# Patient Record
Sex: FEMALE | Race: WHITE | NOT HISPANIC OR LATINO | Employment: FULL TIME | URBAN - METROPOLITAN AREA
[De-identification: names, ages, dates, MRNs, and addresses within clinical notes are randomized per-mention and may not be internally consistent; named-entity substitution may affect disease eponyms.]

---

## 2023-05-13 DIAGNOSIS — E78.5 HYPERLIPIDEMIA, UNSPECIFIED: ICD-10-CM

## 2023-05-13 RX ORDER — EZETIMIBE 10 MG/1
TABLET ORAL
Qty: 90 TABLET | Refills: 3 | Status: SHIPPED | OUTPATIENT
Start: 2023-05-13 | End: 2024-05-15

## 2023-06-10 LAB
ALANINE AMINOTRANSFERASE (SGPT) (U/L) IN SER/PLAS: 33 U/L (ref 7–45)
ASPARTATE AMINOTRANSFERASE (SGOT) (U/L) IN SER/PLAS: 26 U/L (ref 9–39)
CHOLESTEROL (MG/DL) IN SER/PLAS: 192 MG/DL (ref 0–199)
CHOLESTEROL IN HDL (MG/DL) IN SER/PLAS: 45.6 MG/DL
CHOLESTEROL IN LDL (MG/DL) IN SER/PLAS BY DIRECT ASSAY: 138 MG/DL (ref 0–129)
CHOLESTEROL/HDL RATIO: 4.2
LDL: 119 MG/DL (ref 0–99)
THYROTROPIN (MIU/L) IN SER/PLAS BY DETECTION LIMIT <= 0.05 MIU/L: 2.31 MIU/L (ref 0.44–3.98)
TRIGLYCERIDE (MG/DL) IN SER/PLAS: 139 MG/DL (ref 0–149)
VLDL: 28 MG/DL (ref 0–40)

## 2023-06-16 ENCOUNTER — OFFICE VISIT (OUTPATIENT)
Dept: PRIMARY CARE | Facility: CLINIC | Age: 63
End: 2023-06-16
Payer: COMMERCIAL

## 2023-06-16 DIAGNOSIS — E78.00 PURE HYPERCHOLESTEROLEMIA: ICD-10-CM

## 2023-06-16 DIAGNOSIS — K43.9 VENTRAL HERNIA WITHOUT OBSTRUCTION OR GANGRENE: Primary | ICD-10-CM

## 2023-06-16 DIAGNOSIS — E03.9 ACQUIRED HYPOTHYROIDISM: ICD-10-CM

## 2023-06-16 DIAGNOSIS — E55.9 VITAMIN D DEFICIENCY: ICD-10-CM

## 2023-06-16 PROBLEM — E66.09 CLASS 2 OBESITY DUE TO EXCESS CALORIES WITHOUT SERIOUS COMORBIDITY WITH BODY MASS INDEX (BMI) OF 35.0 TO 35.9 IN ADULT: Status: ACTIVE | Noted: 2023-06-16

## 2023-06-16 PROBLEM — E66.812 CLASS 2 OBESITY DUE TO EXCESS CALORIES WITHOUT SERIOUS COMORBIDITY WITH BODY MASS INDEX (BMI) OF 35.0 TO 35.9 IN ADULT: Status: ACTIVE | Noted: 2023-06-16

## 2023-06-16 PROCEDURE — 1036F TOBACCO NON-USER: CPT | Performed by: INTERNAL MEDICINE

## 2023-06-16 PROCEDURE — 99213 OFFICE O/P EST LOW 20 MIN: CPT | Performed by: INTERNAL MEDICINE

## 2023-06-16 PROCEDURE — 3008F BODY MASS INDEX DOCD: CPT | Performed by: INTERNAL MEDICINE

## 2023-06-16 RX ORDER — CHOLECALCIFEROL (VITAMIN D3) 50 MCG
2000 TABLET ORAL EVERY 24 HOURS
COMMUNITY
End: 2023-07-26 | Stop reason: ALTCHOICE

## 2023-06-16 RX ORDER — LEVOTHYROXINE SODIUM 75 UG/1
1 TABLET ORAL DAILY
COMMUNITY
Start: 2017-04-03 | End: 2023-11-11

## 2023-06-16 RX ORDER — ROSUVASTATIN CALCIUM 20 MG/1
TABLET, COATED ORAL
COMMUNITY
Start: 2017-03-20 | End: 2023-08-08

## 2023-06-16 RX ORDER — ASPIRIN 81 MG/1
TABLET ORAL EVERY 24 HOURS
COMMUNITY
Start: 2019-05-29

## 2023-06-16 ASSESSMENT — ENCOUNTER SYMPTOMS
CONSTITUTIONAL NEGATIVE: 1
CONSTIPATION: 0
CARDIOVASCULAR NEGATIVE: 1
RESPIRATORY NEGATIVE: 1
GASTROINTESTINAL NEGATIVE: 1
FATIGUE: 0
DIAPHORESIS: 0
NERVOUS/ANXIOUS: 0
NEUROLOGICAL NEGATIVE: 1
DRY SKIN: 0
HOARSE VOICE: 0
EYES NEGATIVE: 1
DEPRESSED MOOD: 0
MUSCULOSKELETAL NEGATIVE: 1

## 2023-06-16 NOTE — PROGRESS NOTES
"Subjective   Patient ID: Heather Patton is a 62 y.o. female who presents for Follow-up (4 mo fu).    Hyperlipidemia  This is a chronic problem. The problem is controlled. Recent lipid tests were reviewed and are normal. Exacerbating diseases include hypothyroidism and obesity. She has no history of chronic renal disease. Risk factors for coronary artery disease include dyslipidemia, obesity and post-menopausal.   Thyroid Problem  Presents for follow-up visit. Patient reports no anxiety, cold intolerance, constipation, depressed mood, diaphoresis, dry skin, fatigue or hoarse voice. The symptoms have been stable. Her past medical history is significant for hyperlipidemia.        Review of Systems   Constitutional: Negative.  Negative for diaphoresis and fatigue.   HENT: Negative.  Negative for hoarse voice.    Eyes: Negative.    Respiratory: Negative.     Cardiovascular: Negative.    Gastrointestinal: Negative.  Negative for constipation.   Endocrine: Negative for cold intolerance.   Musculoskeletal: Negative.    Skin: Negative.    Neurological: Negative.    Psychiatric/Behavioral:  The patient is not nervous/anxious.        Objective   /73 (BP Location: Right arm, Patient Position: Sitting, BP Cuff Size: Adult)   Pulse 69   Temp 36.6 °C (97.8 °F) (Temporal)   Ht 1.683 m (5' 6.25\")   Wt 101 kg (223 lb)   BMI 35.72 kg/m²     Physical Exam  Vitals reviewed.   Constitutional:       Appearance: She is obese.   HENT:      Head: Normocephalic and atraumatic.   Eyes:      Conjunctiva/sclera: Conjunctivae normal.   Cardiovascular:      Rate and Rhythm: Normal rate and regular rhythm.      Pulses: Normal pulses.   Pulmonary:      Effort: Pulmonary effort is normal.      Breath sounds: Normal breath sounds.   Abdominal:      Palpations: Abdomen is soft.      Hernia: A hernia is present.   Musculoskeletal:         General: Normal range of motion.      Cervical back: Normal range of motion.   Skin:     General: Skin is " warm and dry.   Neurological:      General: No focal deficit present.   Psychiatric:         Mood and Affect: Mood normal.         Assessment/Plan   Problem List Items Addressed This Visit       Hyperlipidemia    Relevant Orders    Lipid Panel    Hypothyroidism    Relevant Orders    CBC and Auto Differential    Lipid Panel    Comprehensive Metabolic Panel    Thyroid Stimulating Hormone    Ventral hernia - Primary    Relevant Orders    CBC and Auto Differential    Vitamin D deficiency    Relevant Orders    Vitamin D 25-Hydroxy,Total   We have a serious discussion about her weight now, her BMI is excessive, it has been a long time that BMI has not been changing much, we are expecting some weight loss with the lifestyle modification this summer I told her.  Otherwise no PVCs identified, TSH is stable, she remains euthyroid clinically and chemically, she remains on 2000 international units of vitamin D therapy, LDL has been fluctuating and dual combination therapy has been maintained.  Her screening test are reviewed, her ventral hernia is going to be operated somewhere in August or September, surgical evaluation was reviewed, there is no active urological issue.  Visceral obesity persist, there is no obvious lymphedema as she is thinking she may have since he has a procedure where the right groin entry was done for ablative therapy several years ago.  Maintain same therapy, screening tests are up to date, follow-up in 4 months with labs.

## 2023-07-26 VITALS
HEIGHT: 68 IN | BODY MASS INDEX: 33.8 KG/M2 | DIASTOLIC BLOOD PRESSURE: 73 MMHG | SYSTOLIC BLOOD PRESSURE: 117 MMHG | WEIGHT: 223 LBS | HEART RATE: 69 BPM | TEMPERATURE: 97.8 F

## 2023-07-26 RX ORDER — CHOLECALCIFEROL (VITAMIN D3) 50 MCG
2 TABLET ORAL DAILY
COMMUNITY

## 2023-08-08 DIAGNOSIS — E78.5 HYPERLIPIDEMIA, UNSPECIFIED: ICD-10-CM

## 2023-08-08 RX ORDER — ROSUVASTATIN CALCIUM 20 MG/1
20 TABLET, COATED ORAL DAILY
Qty: 90 TABLET | Refills: 3 | Status: SHIPPED | OUTPATIENT
Start: 2023-08-08

## 2023-09-15 ENCOUNTER — HOSPITAL ENCOUNTER (OUTPATIENT)
Dept: DATA CONVERSION | Facility: HOSPITAL | Age: 63
End: 2023-09-16
Attending: SURGERY | Admitting: SURGERY
Payer: COMMERCIAL

## 2023-09-15 DIAGNOSIS — K66.0 PERITONEAL ADHESIONS (POSTPROCEDURAL) (POSTINFECTION): ICD-10-CM

## 2023-09-15 DIAGNOSIS — K43.2 INCISIONAL HERNIA WITHOUT OBSTRUCTION OR GANGRENE: ICD-10-CM

## 2023-09-15 DIAGNOSIS — K43.9 VENTRAL HERNIA WITHOUT OBSTRUCTION OR GANGRENE: ICD-10-CM

## 2023-09-15 LAB
APPEARANCE, URINE: ABNORMAL
BACTERIA, URINE: ABNORMAL /HPF
BILIRUBIN, URINE: NEGATIVE
BLOOD, URINE: ABNORMAL
COLOR, URINE: YELLOW
GLUCOSE, URINE: NEGATIVE MG/DL
KETONES, URINE: NEGATIVE MG/DL
LEUKOCYTE ESTERASE, URINE: NEGATIVE
MUCUS, URINE: ABNORMAL /LPF
NITRITE, URINE: NEGATIVE
PH, URINE: 5 (ref 5–8)
PROTEIN, URINE: NEGATIVE MG/DL
RBC, URINE: 2 /HPF (ref 0–5)
SPECIFIC GRAVITY, URINE: 1.02 (ref 1–1.03)
SQUAMOUS EPITHELIAL CELLS, URINE: 3 /HPF
UROBILINOGEN, URINE: <2 MG/DL (ref 0–1.9)
WBC, URINE: 2 /HPF (ref 0–5)

## 2023-09-16 LAB
ERYTHROCYTE DISTRIBUTION WIDTH (RATIO) BY AUTOMATED COUNT: 12.9 % (ref 11.5–14.5)
ERYTHROCYTE MEAN CORPUSCULAR HEMOGLOBIN CONCENTRATION (G/DL) BY AUTOMATED: 33.1 G/DL (ref 32–36)
ERYTHROCYTE MEAN CORPUSCULAR VOLUME (FL) BY AUTOMATED COUNT: 92 FL (ref 80–100)
ERYTHROCYTES (10*6/UL) IN BLOOD BY AUTOMATED COUNT: 4.21 X10E12/L (ref 4–5.2)
HEMATOCRIT (%) IN BLOOD BY AUTOMATED COUNT: 38.7 % (ref 36–46)
HEMOGLOBIN (G/DL) IN BLOOD: 12.8 G/DL (ref 12–16)
LEUKOCYTES (10*3/UL) IN BLOOD BY AUTOMATED COUNT: 9.9 X10E9/L (ref 4.4–11.3)
PLATELETS (10*3/UL) IN BLOOD AUTOMATED COUNT: 270 X10E9/L (ref 150–450)

## 2023-09-29 VITALS
RESPIRATION RATE: 18 BRPM | TEMPERATURE: 97.7 F | BODY MASS INDEX: 33.31 KG/M2 | HEART RATE: 88 BPM | HEIGHT: 68 IN | WEIGHT: 219.8 LBS | DIASTOLIC BLOOD PRESSURE: 92 MMHG | SYSTOLIC BLOOD PRESSURE: 157 MMHG

## 2023-09-30 NOTE — DISCHARGE SUMMARY
Send Summary:     Note Recipients:        Discharge:    Summary:   Admission Date: .15-Sep-2023 10:17:00   Discharge Date: 16-Sep-2023   Admission Reason: Ventral hernia   Final Discharge Diagnoses: Ventral hernia   Procedures: Laparoscopic recurrent ventral hernia  repair   Hospital Course:    Planned admission postoperatively for pain control, pulmonary toilet, observation after surgery as outlined above.  Discharge planning once pain was adequately controlled  with oral medications, ambulating, tolerating a diet, voiding easily, no concerns on postoperative presentation.  Office follow-up arranged      Discharge Information:    and Continuing Care:   Lab Results - Pending:    None  Radiology Results - Pending: None   Discharge Instructions:    Additional Orders:           Additional Instructions:   Diet as tolerated  No lifting greater than 20 pounds x 8 weeks  Okay to shower, avoid saturating dressing  Okay to drive once off pain medicine    Follow Up Appointments:    Follow-Up Appointment 01:           Physician/Dept/Service:   Dex          Reason for Referral:   Postop visit          Call to Schedule in:   2 weeks          Phone Number:   [phone]    Discharge Medications: Home Medication   Zetia 10 mg oral tablet - 1 tab(s) orally once a day  rosuvastatin 20 mg oral tablet - 1 tab(s) orally once a day  levothyroxine 75 mcg (0.075 mg) oral capsule - 1 cap(s) orally once a day  Vitamin D3 50 mcg (2000 intl units) oral capsule - 2 cap(s) orally once a day  aspirin 81 mg oral tablet - 1 tab(s) orally once a day     PRN Medication   oxycodone-acetaminophen 5 mg-325 mg oral tablet - 1 tab(s) orally every 6 hours x 5 days, As Needed -.Patient Location  -for pain G89.1  docusate sodium 100 mg oral capsule - 1 cap(s) orally 1 to 2 times a day, As Needed -for constipation     DNR Status:   ·  Code Status Code Status order at time of discharge: Full Code       Electronic Signatures:  Katherine Kowalski)   (Signed 15-Sep-2023 13:25)   Authored: Send Summary, Summary Content, Ongoing Care,  DNR Status, Note Completion      Last Updated: 15-Sep-2023 13:25 by Katherine Kowalski)

## 2023-09-30 NOTE — H&P
History of Present Illness:   History Present Illness:  Reason for surgery: recurrent ventral hernia   HPI:    As above    Allergies:        Allergies:  ·  NKDA :   ·  Baking  Soda: Swelling/Edema, Rash (Severe), Itching  ·  Tape  - Adhesive, Bandaids, Paper: Rash, Itching    Home Medication Review:   Home Medications Reviewed: yes     Impression/Procedure:   ·  Impression and Planned Procedure: laparoscopic VHR possible open       ERAS (Enhanced Recovery After Surgery):  ·  ERAS Patient: no       Vital Signs:  Temperature C: 36.5 degrees C   Temperature F: 97.7 degrees F   Heart Rate: 88 beats per minute   Respiratory Rate: 18 breath per minute   Blood Pressure Systolic: 157 mm/Hg   Blood Pressure Diastolic: 92 mm/Hg     Physical Exam by System:    Constitutional: Well developed, awake/alert/oriented  x3, no distress, alert and cooperative   Eyes: PERRL, EOMI, clear sclera   ENMT: mucous membranes moist, no apparent injury,  no lesions seen   Head/Neck: Neck supple, no apparent injury   Respiratory/Thorax: Patent airways, CTAB, normal  breath sounds with good chest expansion, thorax symmetric   Cardiovascular: Regular, rate and rhythm   Gastrointestinal: Nondistended, soft, non-tender,  no rebound tenderness or guarding, no masses palpable, no organomegaly   Musculoskeletal: ROM intact, no joint swelling, normal  strength   Extremities: normal extremities, no cyanosis edema,  contusions or wounds, no clubbing   Neurological: alert and oriented x3, intact senses,  motor, response and reflexes, normal strength   Psychological: Appropriate mood and behavior   Skin: Warm and dry, no lesions, no rashes     Consent:   COVID-19 Consent:  ·  COVID-19 Risk Consent Surgeon has reviewed key risks related to the risk of jose COVID-19 and if they contract COVID-19 what the risks are.       Electronic Signatures:  Katherine Kowalski)  (Signed 15-Sep-2023 12:47)   Authored: History of Present Illness, Allergies, Home   Medication Review, Impression/Procedure, ERAS, Physical Exam, Consent, Note Completion      Last Updated: 15-Sep-2023 12:47 by Katherine Kowalski)

## 2023-10-01 NOTE — OP NOTE
PROCEDURE DETAILS    Preoperative Diagnosis:  Recurrent ventral hernia  Postoperative Diagnosis:  Recurrent ventral hernia  Surgeon: Katherine Kowalski  Resident/Fellow/Other Assistant: Beto Ordaz    Procedure:  1. LAPAROSCOPIC VENTRAL HERNIA REPAIR, RECURRENT    Anesthesia: No anesthesiologist associated with this case  Estimated Blood Loss: 0  Findings: Report  Specimens(s) Collected: no,     Complications: none        Operative Report:   After satisfactory induction of anesthesia, patient was prepped and draped.  Perioperative tap block performed by anesthesia.  Local anesthesia was infiltrated and limited supraumbilical  incision was made, dissection carried down to fascia which was then sharply incised allowing placement of a Doll trocar.  Pneumoperitoneum was established and exploratory laparoscopy was carried out.  Under direct visualization 2 additional 5 mm trocars  were placed in the mid abdomen bilaterally.  Initial survey revealed filmy omental adhesions involving the known lower abdominal recurrent ventral hernia.  These were easily taken down with the LigaSure device.  With complete adhesiolysis, the hernia  sac and ablation of the peritoneum within the hernia was accomplished with a combination of the LigaSure device and cautery.  Under reduced intra-abdominal pressure, the hernia defect was measured at approximately 9 cm in length by 4 cm in width.  Christopher  Navarro device was used after separate stab incisions overlying the hernia defect in the midline for transfascial reapproximation of the fascial defect using 0 Ethibond suture.  Pneumoperitoneum was reestablished demonstrating excellent reapproximation  of the fascia in the midline, in a tension-free manner.  An 11 x 15 cm dual sided Ventralex mesh with echo positioning device was chosen for underlay repair.  This was rolled and passed into the abdominal cavity, centered appropriately along the hernia  defect and secured in double crown  technique using the absorbable tacking device.  Echo device removed intact.  Mesh placement was inspected and found to be in appropriate position.  No other pathology was identified on laparoscopy.  Trocars removed under  direct visualization.  Fascial defect reapproximated with 0 Vicryl suture in a figure-of-eight manner and the skin of all sites reapproximated with Monocryl.  Dressings applied.  Patient awakened from anesthesia and transferred to recovery in satisfactory  condition, tolerated well.                        Attestation:   Note Completion:  Attending Attestation I performed the procedure without a resident         Electronic Signatures:  Katherine Kowalski)  (Signed 15-Sep-2023 16:01)   Authored: Post-Operative Note, Chart Review, Note Completion      Last Updated: 15-Sep-2023 16:01 by Katherine Kowalski)

## 2023-10-14 ENCOUNTER — LAB (OUTPATIENT)
Dept: LAB | Facility: LAB | Age: 63
End: 2023-10-14
Payer: COMMERCIAL

## 2023-10-14 DIAGNOSIS — E55.9 VITAMIN D DEFICIENCY: ICD-10-CM

## 2023-10-14 DIAGNOSIS — E03.9 ACQUIRED HYPOTHYROIDISM: ICD-10-CM

## 2023-10-14 DIAGNOSIS — K43.9 VENTRAL HERNIA WITHOUT OBSTRUCTION OR GANGRENE: ICD-10-CM

## 2023-10-14 DIAGNOSIS — E78.00 PURE HYPERCHOLESTEROLEMIA: ICD-10-CM

## 2023-10-14 LAB
25(OH)D3 SERPL-MCNC: 32 NG/ML (ref 30–100)
ALBUMIN SERPL BCP-MCNC: 4.3 G/DL (ref 3.4–5)
ALP SERPL-CCNC: 84 U/L (ref 33–136)
ALT SERPL W P-5'-P-CCNC: 27 U/L (ref 7–45)
ANION GAP SERPL CALC-SCNC: 11 MMOL/L (ref 10–20)
AST SERPL W P-5'-P-CCNC: 20 U/L (ref 9–39)
BASOPHILS # BLD AUTO: 0.04 X10*3/UL (ref 0–0.1)
BASOPHILS NFR BLD AUTO: 0.7 %
BILIRUB SERPL-MCNC: 0.5 MG/DL (ref 0–1.2)
BUN SERPL-MCNC: 16 MG/DL (ref 6–23)
CALCIUM SERPL-MCNC: 9.2 MG/DL (ref 8.6–10.3)
CHLORIDE SERPL-SCNC: 104 MMOL/L (ref 98–107)
CHOLEST SERPL-MCNC: 201 MG/DL (ref 0–199)
CHOLESTEROL/HDL RATIO: 4.9
CO2 SERPL-SCNC: 27 MMOL/L (ref 21–32)
CREAT SERPL-MCNC: 0.8 MG/DL (ref 0.5–1.05)
EOSINOPHIL # BLD AUTO: 1 X10*3/UL (ref 0–0.7)
EOSINOPHIL NFR BLD AUTO: 16.8 %
ERYTHROCYTE [DISTWIDTH] IN BLOOD BY AUTOMATED COUNT: 13 % (ref 11.5–14.5)
GFR SERPL CREATININE-BSD FRML MDRD: 82 ML/MIN/1.73M*2
GLUCOSE SERPL-MCNC: 123 MG/DL (ref 74–99)
HCT VFR BLD AUTO: 42.4 % (ref 36–46)
HDLC SERPL-MCNC: 40.9 MG/DL
HGB BLD-MCNC: 13.5 G/DL (ref 12–16)
IMM GRANULOCYTES # BLD AUTO: 0.01 X10*3/UL (ref 0–0.7)
IMM GRANULOCYTES NFR BLD AUTO: 0.2 % (ref 0–0.9)
LDLC SERPL CALC-MCNC: 127 MG/DL
LYMPHOCYTES # BLD AUTO: 1.62 X10*3/UL (ref 1.2–4.8)
LYMPHOCYTES NFR BLD AUTO: 27.1 %
MCH RBC QN AUTO: 30.3 PG (ref 26–34)
MCHC RBC AUTO-ENTMCNC: 31.8 G/DL (ref 32–36)
MCV RBC AUTO: 95 FL (ref 80–100)
MONOCYTES # BLD AUTO: 0.35 X10*3/UL (ref 0.1–1)
MONOCYTES NFR BLD AUTO: 5.9 %
NEUTROPHILS # BLD AUTO: 2.95 X10*3/UL (ref 1.2–7.7)
NEUTROPHILS NFR BLD AUTO: 49.3 %
NON HDL CHOLESTEROL: 160 MG/DL (ref 0–149)
NRBC BLD-RTO: 0 /100 WBCS (ref 0–0)
PLATELET # BLD AUTO: 264 X10*3/UL (ref 150–450)
PMV BLD AUTO: 9.7 FL (ref 7.5–11.5)
POTASSIUM SERPL-SCNC: 4.2 MMOL/L (ref 3.5–5.3)
PROT SERPL-MCNC: 6.6 G/DL (ref 6.4–8.2)
RBC # BLD AUTO: 4.45 X10*6/UL (ref 4–5.2)
SODIUM SERPL-SCNC: 138 MMOL/L (ref 136–145)
TRIGL SERPL-MCNC: 166 MG/DL (ref 0–149)
TSH SERPL-ACNC: 3.81 MIU/L (ref 0.44–3.98)
VLDL: 33 MG/DL (ref 0–40)
WBC # BLD AUTO: 6 X10*3/UL (ref 4.4–11.3)

## 2023-10-14 PROCEDURE — 36415 COLL VENOUS BLD VENIPUNCTURE: CPT

## 2023-10-14 PROCEDURE — 80061 LIPID PANEL: CPT

## 2023-10-14 PROCEDURE — 85025 COMPLETE CBC W/AUTO DIFF WBC: CPT

## 2023-10-14 PROCEDURE — 80053 COMPREHEN METABOLIC PANEL: CPT

## 2023-10-14 PROCEDURE — 84443 ASSAY THYROID STIM HORMONE: CPT

## 2023-10-14 PROCEDURE — 82306 VITAMIN D 25 HYDROXY: CPT

## 2023-10-17 ENCOUNTER — OFFICE VISIT (OUTPATIENT)
Dept: PRIMARY CARE | Facility: CLINIC | Age: 63
End: 2023-10-17
Payer: COMMERCIAL

## 2023-10-17 VITALS
WEIGHT: 225 LBS | SYSTOLIC BLOOD PRESSURE: 117 MMHG | HEIGHT: 67 IN | HEART RATE: 67 BPM | DIASTOLIC BLOOD PRESSURE: 78 MMHG | TEMPERATURE: 97.9 F | BODY MASS INDEX: 35.31 KG/M2

## 2023-10-17 DIAGNOSIS — E55.9 VITAMIN D DEFICIENCY: ICD-10-CM

## 2023-10-17 DIAGNOSIS — E03.9 ACQUIRED HYPOTHYROIDISM: ICD-10-CM

## 2023-10-17 DIAGNOSIS — E66.09 CLASS 2 OBESITY DUE TO EXCESS CALORIES WITHOUT SERIOUS COMORBIDITY WITH BODY MASS INDEX (BMI) OF 35.0 TO 35.9 IN ADULT: ICD-10-CM

## 2023-10-17 DIAGNOSIS — R73.9 HYPERGLYCEMIA: ICD-10-CM

## 2023-10-17 DIAGNOSIS — E78.00 PURE HYPERCHOLESTEROLEMIA: Primary | ICD-10-CM

## 2023-10-17 PROCEDURE — 1036F TOBACCO NON-USER: CPT | Performed by: INTERNAL MEDICINE

## 2023-10-17 PROCEDURE — 99213 OFFICE O/P EST LOW 20 MIN: CPT | Performed by: INTERNAL MEDICINE

## 2023-10-17 PROCEDURE — 3008F BODY MASS INDEX DOCD: CPT | Performed by: INTERNAL MEDICINE

## 2023-10-17 ASSESSMENT — ENCOUNTER SYMPTOMS
ABDOMINAL DISTENTION: 0
ABDOMINAL PAIN: 0
GASTROINTESTINAL NEGATIVE: 1
RESPIRATORY NEGATIVE: 1
CARDIOVASCULAR NEGATIVE: 1
CONSTITUTIONAL NEGATIVE: 1
EYES NEGATIVE: 1
WEAKNESS: 0
DIZZINESS: 0
MUSCULOSKELETAL NEGATIVE: 1

## 2023-10-17 NOTE — PROGRESS NOTES
"Subjective   Patient ID: Heather Patton is a 63 y.o. female who presents for Follow-up (4 mo fu).    HPI   Hyperlipidemia  This is a chronic problem. The problem is controlled. Recent lipid tests were reviewed and are normal. Exacerbating diseases include hypothyroidism and obesity. She has no history of chronic renal disease. Risk factors for coronary artery disease include dyslipidemia, obesity and post-menopausal.   Thyroid Problem  Presents for follow-up visit. Patient reports no anxiety, cold intolerance, constipation, depressed mood, diaphoresis, dry skin, fatigue or hoarse voice. The symptoms have been stable. Her past medical history is significant for hyperlipidemia.      Review of Systems   Constitutional: Negative.    HENT: Negative.     Eyes: Negative.    Respiratory: Negative.     Cardiovascular: Negative.    Gastrointestinal: Negative.  Negative for abdominal distention and abdominal pain.   Musculoskeletal: Negative.    Skin: Negative.    Neurological:  Negative for dizziness and weakness.       Objective   /78 (BP Location: Right arm, Patient Position: Sitting, BP Cuff Size: Adult)   Pulse 67   Temp 36.6 °C (97.9 °F) (Temporal)   Ht 1.702 m (5' 7\")   Wt 102 kg (225 lb)   BMI 35.24 kg/m²     Physical Exam  Vitals reviewed.   Constitutional:       Appearance: Normal appearance. She is obese.   HENT:      Head: Normocephalic.   Eyes:      Conjunctiva/sclera: Conjunctivae normal.   Cardiovascular:      Rate and Rhythm: Normal rate and regular rhythm.      Pulses: Normal pulses.   Pulmonary:      Effort: Pulmonary effort is normal.      Breath sounds: Normal breath sounds.   Abdominal:      General: There is no distension.      Palpations: Abdomen is soft.      Tenderness: There is no guarding.      Hernia: No hernia is present.      Comments: Wounds are healing well   Musculoskeletal:         General: Normal range of motion.      Cervical back: Normal range of motion.   Skin:     General: Skin " is warm and dry.   Neurological:      General: No focal deficit present.   Psychiatric:         Mood and Affect: Mood normal.         Assessment/Plan   Problem List Items Addressed This Visit             ICD-10-CM    Hyperlipidemia - Primary E78.5    Hypothyroidism E03.9    Vitamin D deficiency E55.9    Class 2 obesity due to excess calories without serious comorbidity with body mass index (BMI) of 35.0 to 35.9 in adult E66.09, Z68.35   Laparoscopic hernia surgery went well, op report was reviewed, postop course was uneventful, wounds are healing very well patient was very happy to see the outcome of the surgery and so I.  Recent laboratories were reviewed, vitamin D is therapeutic, LDL fluctuates, statin Zetia combination to be continued.  She has a history of right ventricular outflow tract ventricular arrhythmias, no rhythm disturbance obviously noted in the hospital course.  Her blood sugars are fluctuating, we will check hemoglobin A1c, we hope that there is no more kidney stones, she will be due for colonoscopy next year.  She remains euthyroid clinically and chemically, now we are targeting a progressive weight loss, as she is approaching to retire in the next several months we need to have a weight loss measures and increased effort for calorie restriction and shrinkage of visceral obesity, laboratories next time with hemoglobin A1c in between, follow-up in 4 months.

## 2023-11-11 DIAGNOSIS — E03.9 HYPOTHYROIDISM, UNSPECIFIED: ICD-10-CM

## 2023-11-11 RX ORDER — LEVOTHYROXINE SODIUM 75 UG/1
75 TABLET ORAL DAILY
Qty: 90 TABLET | Refills: 3 | Status: SHIPPED | OUTPATIENT
Start: 2023-11-11

## 2023-11-25 ENCOUNTER — LAB (OUTPATIENT)
Dept: LAB | Facility: LAB | Age: 63
End: 2023-11-25
Payer: COMMERCIAL

## 2023-11-25 DIAGNOSIS — N20.0 CALCULUS OF KIDNEY: Primary | ICD-10-CM

## 2023-11-25 LAB
ANION GAP SERPL CALC-SCNC: 13 MMOL/L (ref 10–20)
BUN SERPL-MCNC: 13 MG/DL (ref 6–23)
CALCIUM SERPL-MCNC: 9.1 MG/DL (ref 8.6–10.3)
CHLORIDE SERPL-SCNC: 105 MMOL/L (ref 98–107)
CO2 SERPL-SCNC: 25 MMOL/L (ref 21–32)
CREAT SERPL-MCNC: 0.84 MG/DL (ref 0.5–1.05)
GFR SERPL CREATININE-BSD FRML MDRD: 78 ML/MIN/1.73M*2
GLUCOSE SERPL-MCNC: 114 MG/DL (ref 74–99)
POTASSIUM SERPL-SCNC: 4.2 MMOL/L (ref 3.5–5.3)
SODIUM SERPL-SCNC: 139 MMOL/L (ref 136–145)

## 2023-11-25 PROCEDURE — 80048 BASIC METABOLIC PNL TOTAL CA: CPT

## 2023-11-25 PROCEDURE — 36415 COLL VENOUS BLD VENIPUNCTURE: CPT

## 2023-11-28 ENCOUNTER — HOSPITAL ENCOUNTER (OUTPATIENT)
Dept: RADIOLOGY | Facility: HOSPITAL | Age: 63
Discharge: HOME | End: 2023-11-28
Payer: COMMERCIAL

## 2023-11-28 DIAGNOSIS — N20.0 CALCULUS OF KIDNEY: ICD-10-CM

## 2023-11-28 PROCEDURE — 76770 US EXAM ABDO BACK WALL COMP: CPT

## 2023-11-28 PROCEDURE — 76770 US EXAM ABDO BACK WALL COMP: CPT | Performed by: RADIOLOGY

## 2023-11-30 ENCOUNTER — APPOINTMENT (OUTPATIENT)
Dept: UROLOGY | Facility: CLINIC | Age: 63
End: 2023-11-30
Payer: COMMERCIAL

## 2023-12-07 ENCOUNTER — OFFICE VISIT (OUTPATIENT)
Dept: UROLOGY | Facility: CLINIC | Age: 63
End: 2023-12-07
Payer: COMMERCIAL

## 2023-12-07 VITALS
WEIGHT: 219 LBS | HEIGHT: 68 IN | BODY MASS INDEX: 33.19 KG/M2 | DIASTOLIC BLOOD PRESSURE: 80 MMHG | SYSTOLIC BLOOD PRESSURE: 136 MMHG | HEART RATE: 91 BPM

## 2023-12-07 DIAGNOSIS — N20.0 NEPHROLITHIASIS: Primary | ICD-10-CM

## 2023-12-07 PROCEDURE — 3008F BODY MASS INDEX DOCD: CPT | Performed by: UROLOGY

## 2023-12-07 PROCEDURE — 1036F TOBACCO NON-USER: CPT | Performed by: UROLOGY

## 2023-12-07 PROCEDURE — 99213 OFFICE O/P EST LOW 20 MIN: CPT | Performed by: UROLOGY

## 2023-12-07 NOTE — PROGRESS NOTES
PRIOR NOTES  62-year-old female following up from ER for kidney stone pain  PMH: Hyperlipidemia, hypothyroid, BMI 33  Patient presented to the ED 9/17/2022 with right flank pain. She is afebrile and hemodynamically stable.   WBC 8.7  Serum creatinine 0.81  UA: 50 RBCs, 1 WBC, pH 5.5  CT A/P Noncon bilateral faint nonobstructing calculi, 7 mm stone at the right ureteropelvic junction with very trace hydronephrosis.  Skin to stone distance 13cm  Mean   Unclear if radio-opaque  First stone episode  Discharged from ED w/ pain control. Pain has been poorly controlled, requiring pain meds around the clock.     OR 9/23/22 - R ESWL, somewhat poor fragmentation but post-op CT showed adequate fragmentation  Stent removed 9/23/22     FUV 11/28/22 - pain free, no gross hematuria  KUB 11/14/22 - 2 possible right renal calcifications, largest 4.5mm  RBUS 11/15/22 - 6mm lower pole non-obstructing stone, left possible 5mm stone     FUV 5/25/23 - Doing well, no flank pain. Considering surgical management of hernia  RBUS 5/22/23 - trace hydronephrosis left, no nephrolithiasis. No R sided stones.  KUB no stones visible     11/28/23 - RBUS - No hydronephrosis or calculi  Scr 0.84    UPDATED SUBJECTIVE HISTORY  12/07/23 - Pt is feeling well, no issues.     Past Medical History  She has no past medical history on file.    Surgical History  She has a past surgical history that includes Other surgical history (05/13/2019); Other surgical history (05/13/2019); Other surgical history (05/13/2019); Other surgical history (05/29/2019); and Other surgical history (05/29/2019).     Social History  She reports that she has never smoked. She has never used smokeless tobacco. She reports that she does not drink alcohol and does not use drugs.    Family History  No family history on file.     Allergies  Other and Adhesive tape-silicones    ROS: 12 system review was completed and is negative with the exception of those signs and symptoms noted  "in the history of present illness: A 12 system review was completed and is negative with the exception of those signs and symptoms noted in the history of present illness.     Exam:  General: in NAD, appears stated age  Head: normocephalic, atraumatic  Respiratory: normal effort, no use of accessory muscles  Cardiovascular: no edema noted  Skin: normal turgor, no rashes  Neurologic: grossly intact, oriented to person/place/time  Psychiatric: mode and affect appropriate     Last Recorded Vitals  Blood pressure 136/80, pulse 91, height 1.727 m (5' 8\"), weight 99.3 kg (219 lb).    Lab Results   Component Value Date    CREATININE 0.84 11/25/2023    HGB 13.5 10/14/2023         ASSESSMENT/PLAN:  # Nephrolithiasis  -No residual stone or hydronephrosis  -Follow-up as needed  -We discussed dietary prevention of kidney stones    Anders Yap MD    "

## 2024-02-08 DIAGNOSIS — R39.15 URGENCY OF URINATION: ICD-10-CM

## 2024-02-10 ENCOUNTER — LAB (OUTPATIENT)
Dept: LAB | Facility: LAB | Age: 64
End: 2024-02-10
Payer: COMMERCIAL

## 2024-02-10 DIAGNOSIS — R39.15 URGENCY OF URINATION: ICD-10-CM

## 2024-02-10 DIAGNOSIS — E03.9 ACQUIRED HYPOTHYROIDISM: ICD-10-CM

## 2024-02-10 DIAGNOSIS — E78.00 PURE HYPERCHOLESTEROLEMIA: ICD-10-CM

## 2024-02-10 DIAGNOSIS — R73.9 HYPERGLYCEMIA: ICD-10-CM

## 2024-02-10 DIAGNOSIS — E55.9 VITAMIN D DEFICIENCY: ICD-10-CM

## 2024-02-10 LAB
APPEARANCE UR: CLEAR
BACTERIA #/AREA URNS AUTO: ABNORMAL /HPF
BILIRUB UR STRIP.AUTO-MCNC: NEGATIVE MG/DL
CHOLEST SERPL-MCNC: 182 MG/DL (ref 0–199)
CHOLESTEROL/HDL RATIO: 4.2
COLOR UR: ABNORMAL
EST. AVERAGE GLUCOSE BLD GHB EST-MCNC: 143 MG/DL
GLUCOSE UR STRIP.AUTO-MCNC: NEGATIVE MG/DL
HBA1C MFR BLD: 6.6 %
HDLC SERPL-MCNC: 43.6 MG/DL
KETONES UR STRIP.AUTO-MCNC: NEGATIVE MG/DL
LDLC SERPL CALC-MCNC: 117 MG/DL
LEUKOCYTE ESTERASE UR QL STRIP.AUTO: ABNORMAL
MUCOUS THREADS #/AREA URNS AUTO: ABNORMAL /LPF
NITRITE UR QL STRIP.AUTO: NEGATIVE
NON HDL CHOLESTEROL: 138 MG/DL (ref 0–149)
PH UR STRIP.AUTO: 6 [PH]
PROT UR STRIP.AUTO-MCNC: NEGATIVE MG/DL
RBC # UR STRIP.AUTO: NEGATIVE /UL
RBC #/AREA URNS AUTO: ABNORMAL /HPF
SP GR UR STRIP.AUTO: 1
SQUAMOUS #/AREA URNS AUTO: ABNORMAL /HPF
TRIGL SERPL-MCNC: 107 MG/DL (ref 0–149)
TSH SERPL-ACNC: 2.47 MIU/L (ref 0.44–3.98)
UROBILINOGEN UR STRIP.AUTO-MCNC: <2 MG/DL
VLDL: 21 MG/DL (ref 0–40)
WBC #/AREA URNS AUTO: ABNORMAL /HPF

## 2024-02-10 PROCEDURE — 82306 VITAMIN D 25 HYDROXY: CPT

## 2024-02-10 PROCEDURE — 80061 LIPID PANEL: CPT

## 2024-02-10 PROCEDURE — 81001 URINALYSIS AUTO W/SCOPE: CPT

## 2024-02-10 PROCEDURE — 84443 ASSAY THYROID STIM HORMONE: CPT

## 2024-02-10 PROCEDURE — 87086 URINE CULTURE/COLONY COUNT: CPT

## 2024-02-10 PROCEDURE — 36415 COLL VENOUS BLD VENIPUNCTURE: CPT

## 2024-02-10 PROCEDURE — 83036 HEMOGLOBIN GLYCOSYLATED A1C: CPT

## 2024-02-11 LAB — BACTERIA UR CULT: NORMAL

## 2024-02-14 ENCOUNTER — OFFICE VISIT (OUTPATIENT)
Dept: PRIMARY CARE | Facility: CLINIC | Age: 64
End: 2024-02-14
Payer: COMMERCIAL

## 2024-02-14 VITALS
SYSTOLIC BLOOD PRESSURE: 134 MMHG | DIASTOLIC BLOOD PRESSURE: 80 MMHG | HEART RATE: 78 BPM | WEIGHT: 219 LBS | HEIGHT: 68 IN | TEMPERATURE: 97.1 F | BODY MASS INDEX: 33.19 KG/M2

## 2024-02-14 DIAGNOSIS — R39.15 URGENCY OF URINATION: ICD-10-CM

## 2024-02-14 DIAGNOSIS — E78.00 PURE HYPERCHOLESTEROLEMIA: Primary | ICD-10-CM

## 2024-02-14 DIAGNOSIS — E03.9 ACQUIRED HYPOTHYROIDISM: ICD-10-CM

## 2024-02-14 DIAGNOSIS — E55.9 VITAMIN D DEFICIENCY: ICD-10-CM

## 2024-02-14 DIAGNOSIS — E66.09 CLASS 1 OBESITY DUE TO EXCESS CALORIES WITHOUT SERIOUS COMORBIDITY WITH BODY MASS INDEX (BMI) OF 33.0 TO 33.9 IN ADULT: ICD-10-CM

## 2024-02-14 DIAGNOSIS — E74.39 GLUCOSE INTOLERANCE: ICD-10-CM

## 2024-02-14 PROBLEM — E66.811 CLASS 1 OBESITY DUE TO EXCESS CALORIES WITHOUT SERIOUS COMORBIDITY WITH BODY MASS INDEX (BMI) OF 33.0 TO 33.9 IN ADULT: Status: ACTIVE | Noted: 2023-06-16

## 2024-02-14 LAB — 25(OH)D3 SERPL-MCNC: 34 NG/ML (ref 30–100)

## 2024-02-14 PROCEDURE — 1036F TOBACCO NON-USER: CPT | Performed by: INTERNAL MEDICINE

## 2024-02-14 PROCEDURE — 3008F BODY MASS INDEX DOCD: CPT | Performed by: INTERNAL MEDICINE

## 2024-02-14 PROCEDURE — 99213 OFFICE O/P EST LOW 20 MIN: CPT | Performed by: INTERNAL MEDICINE

## 2024-02-14 ASSESSMENT — ENCOUNTER SYMPTOMS
EYES NEGATIVE: 1
SHORTNESS OF BREATH: 0
APNEA: 0
DIAPHORESIS: 0
CONSTITUTIONAL NEGATIVE: 1
CARDIOVASCULAR NEGATIVE: 1
NUMBNESS: 0
GASTROINTESTINAL NEGATIVE: 1
WEAKNESS: 0
ABDOMINAL PAIN: 0
CHANGE IN BOWEL HABIT: 0
COUGH: 0
ARTHRALGIAS: 0

## 2024-02-14 NOTE — PROGRESS NOTES
"Subjective   Patient ID: Heather Patton is a 63 y.o. female who presents for Follow-up (4 mo fu and bladder issues).    Heart Problem  This is a chronic problem. The current episode started more than 1 year ago. The problem occurs rarely. The problem has been resolved. Pertinent negatives include no abdominal pain, arthralgias, change in bowel habit, chest pain, congestion, coughing, diaphoresis, numbness or weakness. The treatment provided significant relief.      Hyperlipidemia  This is a chronic problem. The problem is controlled. Recent lipid tests were reviewed and are normal. Exacerbating diseases include hypothyroidism and obesity. She has no history of chronic renal disease. Risk factors for coronary artery disease include dyslipidemia, obesity and post-menopausal.   Thyroid Problem  Presents for follow-up visit. Patient reports no anxiety, cold intolerance, constipation, depressed mood, diaphoresis, dry skin, fatigue or hoarse voice. The symptoms have been stable. Her past medical history is significant for hyperlipidemia.   Review of Systems   Constitutional: Negative.  Negative for diaphoresis.   HENT:  Negative for congestion.    Eyes: Negative.    Respiratory:  Negative for apnea, cough and shortness of breath.    Cardiovascular: Negative.  Negative for chest pain.   Gastrointestinal: Negative.  Negative for abdominal pain and change in bowel habit.   Musculoskeletal:  Negative for arthralgias.   Neurological:  Negative for weakness and numbness.       Objective   /80 (BP Location: Right arm, Patient Position: Sitting, BP Cuff Size: Adult)   Pulse 78   Temp 36.2 °C (97.1 °F) (Temporal)   Ht 1.727 m (5' 8\")   Wt 99.3 kg (219 lb)   BMI 33.30 kg/m²     Physical Exam  Vitals reviewed.   Constitutional:       Appearance: Normal appearance. She is obese.   HENT:      Head: Normocephalic.   Eyes:      Conjunctiva/sclera: Conjunctivae normal.   Cardiovascular:      Rate and Rhythm: Normal rate and " regular rhythm.      Pulses: Normal pulses.   Pulmonary:      Effort: Pulmonary effort is normal.      Breath sounds: Normal breath sounds.   Abdominal:      General: There is no distension.      Palpations: Abdomen is soft.      Tenderness: There is no guarding.      Hernia: No hernia is present.      Musculoskeletal:         General: Normal range of motion.      Cervical back: Normal range of motion.   Skin:     General: Skin is warm and dry.   Neurological:      General: No focal deficit present.   Assessment/Plan   Problem List Items Addressed This Visit             ICD-10-CM    Hyperlipidemia - Primary E78.5    Hypothyroidism E03.9    Class 1 obesity due to excess calories without serious comorbidity with body mass index (BMI) of 33.0 to 33.9 in adult E66.09, Z68.33     Other Visit Diagnoses         Codes    Glucose intolerance     E74.39        Her hemoglobin A1c was 6.6, she was consuming lots of cranberry juice so I told her not to consume cranberry juice or any sugary drinks which has a large amount of fructose corn syrup, I would consider it as glucose intolerance and we will check hemoglobin A1c next time and she will check her blood sugar randomly either fasting or postprandial.  Her lipids are reviewed, she remains on Zetia and rosuvastatin.  She could lose some weight, BMI has dropped to 33, she has been complaining of very occasional could be 2-3 times a week urged with the micturition and at that time she may have to go to the beach urination promptly and quickly but it is not associated with any cramps or any dysuria.  There is no kidney stone, renal ultrasound was done in November.  It is not the consequence of hernia repair.  I suggested her to follow-up with the urology where she has gone before for nephrolithiasis.  She remains euthyroid clinically and chemically.  Vitamin D level will be checked, she has a history of a right ventricular outflow tract arrhythmias,  she has been [facility] at [facility]  electrophysiology.  She will Continue to lose weight, her physical exam is largely unremarkable, laboratories will be checked next time with hemoglobin A1c and chemistry profile, follow-up in 4 months.  We will be doing colonoscopy in the later part of this year.

## 2024-03-11 ENCOUNTER — OFFICE VISIT (OUTPATIENT)
Dept: UROLOGY | Facility: CLINIC | Age: 64
End: 2024-03-11
Payer: COMMERCIAL

## 2024-03-11 VITALS
SYSTOLIC BLOOD PRESSURE: 135 MMHG | WEIGHT: 217 LBS | DIASTOLIC BLOOD PRESSURE: 82 MMHG | BODY MASS INDEX: 32.99 KG/M2 | HEART RATE: 94 BPM | TEMPERATURE: 97.7 F

## 2024-03-11 DIAGNOSIS — R39.15 URINARY URGENCY: ICD-10-CM

## 2024-03-11 DIAGNOSIS — N20.0 NEPHROLITHIASIS: Primary | ICD-10-CM

## 2024-03-11 PROCEDURE — 1036F TOBACCO NON-USER: CPT | Performed by: UROLOGY

## 2024-03-11 PROCEDURE — 99213 OFFICE O/P EST LOW 20 MIN: CPT | Performed by: UROLOGY

## 2024-03-11 PROCEDURE — 3008F BODY MASS INDEX DOCD: CPT | Performed by: UROLOGY

## 2024-03-11 NOTE — PROGRESS NOTES
PRIOR NOTES  63-year-old female following up from ER for kidney stone pain  PMH: Hyperlipidemia, hypothyroid, BMI 33  Patient presented to the ED 9/17/2022 with right flank pain. She is afebrile and hemodynamically stable.   WBC 8.7  Serum creatinine 0.81  UA: 50 RBCs, 1 WBC, pH 5.5  CT A/P Noncon bilateral faint nonobstructing calculi, 7 mm stone at the right ureteropelvic junction with very trace hydronephrosis.  Skin to stone distance 13cm  Mean   Unclear if radio-opaque  First stone episode  Discharged from ED w/ pain control. Pain has been poorly controlled, requiring pain meds around the clock.     OR 9/23/22 - R ESWL, somewhat poor fragmentation but post-op CT showed adequate fragmentation  Stent removed 9/23/22     FUV 11/28/22 - pain free, no gross hematuria  KUB 11/14/22 - 2 possible right renal calcifications, largest 4.5mm  RBUS 11/15/22 - 6mm lower pole non-obstructing stone, left possible 5mm stone     FUV 5/25/23 - Doing well, no flank pain. Considering surgical management of hernia  RBUS 5/22/23 - trace hydronephrosis left, no nephrolithiasis. No R sided stones.  KUB no stones visible     11/28/23 - RBUS - No hydronephrosis or calculi  Scr 0.84    UPDATED SUBJECTIVE HISTORY  03/11/24 - Pt reported episode of urinary urgency 1/6/24 without leak. Resolved spontaneously. For 1 weak she had recurrence of symptoms, but has since resolved. At baseline, no urgency or frequency. No hematuria. No f/c/n/v. Last experienced symptoms 3-4w ago.     Past Medical History  She has no past medical history on file.    Surgical History  She has a past surgical history that includes Other surgical history (05/13/2019); Other surgical history (05/13/2019); Other surgical history (05/13/2019); Other surgical history (05/29/2019); and Other surgical history (05/29/2019).     Social History  She reports that she has never smoked. She has never used smokeless tobacco. She reports that she does not drink alcohol and  does not use drugs.    Family History  No family history on file.     Allergies  Adhesive tape-silicones    ROS: 12 system review was completed and is negative with the exception of those signs and symptoms noted in the history of present illness: A 12 system review was completed and is negative with the exception of those signs and symptoms noted in the history of present illness.     Exam:  General: in NAD, appears stated age  Head: normocephalic, atraumatic  Respiratory: normal effort, no use of accessory muscles  Cardiovascular: no edema noted  Skin: normal turgor, no rashes  Neurologic: grossly intact, oriented to person/place/time  Psychiatric: mode and affect appropriate     Last Recorded Vitals  Blood pressure 135/82, pulse 94, temperature 36.5 °C (97.7 °F), temperature source Temporal, weight 98.4 kg (217 lb).    Lab Results   Component Value Date    CREATININE 0.84 11/25/2023    HGB 13.5 10/14/2023         ASSESSMENT/PLAN:  63-year-old female with history of nephrolithiasis now seeing me for episodes of urinary urgency, frequency  -They seem mild and self-limited, fortunately  -Review of her UA and urine culture from that time shows no growth, no RBCs, no WBCs  -We discussed overactive bladder symptoms today, discussed bladder retraining with quick flick Kegel exercises  -We discussed the possibility of vaginal estrogen if the symptoms persist  -I briefly mentioned anticholinergic and beta 3 agonist medications but ultimately recommended against it as she is limited symptoms at this time  -She will call me if she develops persistent symptoms  - discussed prn azo as well    Anders Yap MD

## 2024-05-15 DIAGNOSIS — E78.5 HYPERLIPIDEMIA, UNSPECIFIED: ICD-10-CM

## 2024-05-15 RX ORDER — EZETIMIBE 10 MG/1
TABLET ORAL
Qty: 90 TABLET | Refills: 3 | Status: SHIPPED | OUTPATIENT
Start: 2024-05-15

## 2024-06-20 ENCOUNTER — APPOINTMENT (OUTPATIENT)
Dept: PRIMARY CARE | Facility: CLINIC | Age: 64
End: 2024-06-20
Payer: COMMERCIAL

## 2024-07-08 ENCOUNTER — APPOINTMENT (OUTPATIENT)
Dept: PRIMARY CARE | Facility: CLINIC | Age: 64
End: 2024-07-08
Payer: COMMERCIAL

## 2024-07-16 ENCOUNTER — APPOINTMENT (OUTPATIENT)
Dept: PRIMARY CARE | Facility: CLINIC | Age: 64
End: 2024-07-16
Payer: COMMERCIAL

## 2024-07-31 ENCOUNTER — APPOINTMENT (OUTPATIENT)
Dept: PRIMARY CARE | Facility: CLINIC | Age: 64
End: 2024-07-31
Payer: COMMERCIAL

## 2024-08-03 DIAGNOSIS — E78.5 HYPERLIPIDEMIA, UNSPECIFIED: ICD-10-CM

## 2024-08-03 RX ORDER — ROSUVASTATIN CALCIUM 20 MG/1
20 TABLET, COATED ORAL DAILY
Qty: 90 TABLET | Refills: 3 | Status: SHIPPED | OUTPATIENT
Start: 2024-08-03

## 2024-08-13 ENCOUNTER — APPOINTMENT (OUTPATIENT)
Dept: PRIMARY CARE | Facility: CLINIC | Age: 64
End: 2024-08-13
Payer: COMMERCIAL

## 2024-09-07 ENCOUNTER — LAB (OUTPATIENT)
Dept: LAB | Facility: LAB | Age: 64
End: 2024-09-07
Payer: COMMERCIAL

## 2024-09-07 DIAGNOSIS — E55.9 VITAMIN D DEFICIENCY: ICD-10-CM

## 2024-09-07 DIAGNOSIS — E74.39 GLUCOSE INTOLERANCE: ICD-10-CM

## 2024-09-07 LAB
ALBUMIN SERPL BCP-MCNC: 4.2 G/DL (ref 3.4–5)
ALP SERPL-CCNC: 75 U/L (ref 33–136)
ALT SERPL W P-5'-P-CCNC: 32 U/L (ref 7–45)
ANION GAP SERPL CALC-SCNC: 11 MMOL/L (ref 10–20)
AST SERPL W P-5'-P-CCNC: 24 U/L (ref 9–39)
BILIRUB SERPL-MCNC: 0.4 MG/DL (ref 0–1.2)
BUN SERPL-MCNC: 14 MG/DL (ref 6–23)
CALCIUM SERPL-MCNC: 9.1 MG/DL (ref 8.6–10.3)
CHLORIDE SERPL-SCNC: 107 MMOL/L (ref 98–107)
CO2 SERPL-SCNC: 26 MMOL/L (ref 21–32)
CREAT SERPL-MCNC: 0.77 MG/DL (ref 0.5–1.05)
EGFRCR SERPLBLD CKD-EPI 2021: 86 ML/MIN/1.73M*2
EST. AVERAGE GLUCOSE BLD GHB EST-MCNC: 151 MG/DL
GLUCOSE SERPL-MCNC: 139 MG/DL (ref 74–99)
HBA1C MFR BLD: 6.9 %
POTASSIUM SERPL-SCNC: 4.4 MMOL/L (ref 3.5–5.3)
PROT SERPL-MCNC: 6.4 G/DL (ref 6.4–8.2)
SODIUM SERPL-SCNC: 140 MMOL/L (ref 136–145)
TSH SERPL-ACNC: 3.52 MIU/L (ref 0.44–3.98)

## 2024-09-07 PROCEDURE — 83036 HEMOGLOBIN GLYCOSYLATED A1C: CPT

## 2024-09-07 PROCEDURE — 80053 COMPREHEN METABOLIC PANEL: CPT

## 2024-09-07 PROCEDURE — 82306 VITAMIN D 25 HYDROXY: CPT

## 2024-09-07 PROCEDURE — 36415 COLL VENOUS BLD VENIPUNCTURE: CPT

## 2024-09-07 PROCEDURE — 84443 ASSAY THYROID STIM HORMONE: CPT

## 2024-09-09 ENCOUNTER — APPOINTMENT (OUTPATIENT)
Dept: PRIMARY CARE | Facility: CLINIC | Age: 64
End: 2024-09-09
Payer: COMMERCIAL

## 2024-09-09 VITALS
TEMPERATURE: 96.4 F | BODY MASS INDEX: 33.34 KG/M2 | HEART RATE: 78 BPM | DIASTOLIC BLOOD PRESSURE: 85 MMHG | SYSTOLIC BLOOD PRESSURE: 130 MMHG | WEIGHT: 220 LBS | HEIGHT: 68 IN

## 2024-09-09 DIAGNOSIS — E55.9 VITAMIN D DEFICIENCY: ICD-10-CM

## 2024-09-09 DIAGNOSIS — Z12.11 COLON CANCER SCREENING: ICD-10-CM

## 2024-09-09 DIAGNOSIS — E78.00 PURE HYPERCHOLESTEROLEMIA: Primary | ICD-10-CM

## 2024-09-09 DIAGNOSIS — E03.9 ACQUIRED HYPOTHYROIDISM: ICD-10-CM

## 2024-09-09 DIAGNOSIS — E66.09 CLASS 1 OBESITY DUE TO EXCESS CALORIES WITHOUT SERIOUS COMORBIDITY WITH BODY MASS INDEX (BMI) OF 33.0 TO 33.9 IN ADULT: ICD-10-CM

## 2024-09-09 DIAGNOSIS — Z12.31 ENCOUNTER FOR SCREENING MAMMOGRAM FOR MALIGNANT NEOPLASM OF BREAST: ICD-10-CM

## 2024-09-09 DIAGNOSIS — R73.9 HYPERGLYCEMIA: ICD-10-CM

## 2024-09-09 PROBLEM — K43.9 VENTRAL HERNIA: Status: RESOLVED | Noted: 2023-06-16 | Resolved: 2024-09-09

## 2024-09-09 LAB — 25(OH)D3 SERPL-MCNC: 30 NG/ML (ref 30–100)

## 2024-09-09 PROCEDURE — 99213 OFFICE O/P EST LOW 20 MIN: CPT | Performed by: INTERNAL MEDICINE

## 2024-09-09 PROCEDURE — 1036F TOBACCO NON-USER: CPT | Performed by: INTERNAL MEDICINE

## 2024-09-09 PROCEDURE — 3008F BODY MASS INDEX DOCD: CPT | Performed by: INTERNAL MEDICINE

## 2024-09-09 ASSESSMENT — ENCOUNTER SYMPTOMS
RESPIRATORY NEGATIVE: 1
GASTROINTESTINAL NEGATIVE: 1
MUSCULOSKELETAL NEGATIVE: 1
CONSTITUTIONAL NEGATIVE: 1
CARDIOVASCULAR NEGATIVE: 1
NEUROLOGICAL NEGATIVE: 1

## 2024-09-09 NOTE — PROGRESS NOTES
Subjective   Patient ID: Heather Patton is a 63 y.o. female who presents for Follow-up (4 mo fu).  HPI  Heart Problem  This is a chronic problem. The current episode started more than 1 year ago. The problem occurs rarely. The problem has been resolved. Pertinent negatives include no abdominal pain, arthralgias, change in bowel habit, chest pain, congestion, coughing, diaphoresis, numbness or weakness. The treatment provided significant relief.      Hyperlipidemia  This is a chronic problem. The problem is controlled. Recent lipid tests were reviewed and are normal. Exacerbating diseases include hypothyroidism and obesity. She has no history of chronic renal disease. Risk factors for coronary artery disease include dyslipidemia, obesity and post-menopausal.   Thyroid Problem  Presents for follow-up visit. Patient reports no anxiety, cold intolerance, constipation, depressed mood, diaphoresis, dry skin, fatigue or hoarse voice. The symptoms have been stable. Her past medical history is significant for hyperlipidemia.   Past Medical History  History reviewed. No pertinent past medical history.    Social History  Social History     Tobacco Use    Smoking status: Never    Smokeless tobacco: Never   Vaping Use    Vaping status: Never Used   Substance Use Topics    Alcohol use: Never    Drug use: Never       Family History   No family history on file.    Allergies:  Allergies   Allergen Reactions    Adhesive Tape-Silicones Itching and Rash        Outpatient Medications:  Current Outpatient Medications   Medication Instructions    aspirin 81 mg EC tablet oral, Every 24 hours    cholecalciferol (Vitamin D-3) 50 MCG (2000 UT) tablet 2 tablets, oral, Daily    ezetimibe (Zetia) 10 mg tablet TAKE 1 TABLET BY MOUTH EVERY DAY    levothyroxine (SYNTHROID, LEVOXYL) 75 mcg, oral, Daily    rosuvastatin (CRESTOR) 20 mg, oral, Daily        Review of Systems   Constitutional: Negative.    Respiratory: Negative.     Cardiovascular:  "Negative.    Gastrointestinal: Negative.    Musculoskeletal: Negative.    Neurological: Negative.          Objective       Physical Exam  Vitals reviewed.   Constitutional:       Appearance: Normal appearance. She is obese.   HENT:      Head: Normocephalic and atraumatic.   Eyes:      Conjunctiva/sclera: Conjunctivae normal.   Cardiovascular:      Rate and Rhythm: Normal rate and regular rhythm.      Pulses: Normal pulses.   Pulmonary:      Effort: Pulmonary effort is normal.      Breath sounds: Normal breath sounds.   Abdominal:      Palpations: Abdomen is soft.   Musculoskeletal:         General: No tenderness.      Cervical back: Normal range of motion.   Skin:     General: Skin is warm and dry.   Neurological:      General: No focal deficit present.   Psychiatric:         Mood and Affect: Mood normal.       /85 (BP Location: Left arm, Patient Position: Sitting, BP Cuff Size: Adult)   Pulse 78   Temp 35.8 °C (96.4 °F) (Temporal)   Ht 1.727 m (5' 8\")   Wt 99.8 kg (220 lb)   BMI 33.45 kg/m²      Assessment/Plan   Problem List Items Addressed This Visit       Hyperlipidemia - Primary    Relevant Orders    Lipid Panel    Comprehensive Metabolic Panel    Hypothyroidism    Relevant Orders    TSH with reflex to Free T4 if abnormal    Comprehensive Metabolic Panel    Vitamin D deficiency    Relevant Orders    Vitamin D 25-Hydroxy,Total (for eval of Vitamin D levels)    Class 1 obesity due to excess calories without serious comorbidity with body mass index (BMI) of 33.0 to 33.9 in adult     Other Visit Diagnoses       Encounter for screening mammogram for malignant neoplasm of breast        Relevant Orders    BI mammo bilateral screening tomosynthesis    Colon cancer screening        Relevant Orders    Colonoscopy Screening; Average Risk Patient    Hyperglycemia        Relevant Orders    Hemoglobin A1C        Labs are reviewed, hemoglobin A1c remains abnormal reflecting hyperglycemia.  She will try her best to " change her diet more than what she has done, carbohydrates, anything made from refined sugars, food with high glycemic index needs to be avoided.  Check postprandial sugar frequently particularly after dinner.  Continue statins and Zetia, lipids next time, she will be due for mammography and screening colonoscopy please proceed with that.  Vitamin D level will be checked, she has had ablation treatment in the past, she remains euthyroid clinically and chemically, weight has not changed significantly, overall her physical health is stable, list of medications are reviewed, A1c will be checked next time, follow-up in 4 months.

## 2024-11-19 ENCOUNTER — HOSPITAL ENCOUNTER (OUTPATIENT)
Dept: RADIOLOGY | Facility: HOSPITAL | Age: 64
Discharge: HOME | End: 2024-11-19
Payer: MEDICARE

## 2024-11-19 VITALS — HEIGHT: 68 IN | BODY MASS INDEX: 33.34 KG/M2 | WEIGHT: 220 LBS

## 2024-11-19 DIAGNOSIS — Z12.31 ENCOUNTER FOR SCREENING MAMMOGRAM FOR MALIGNANT NEOPLASM OF BREAST: ICD-10-CM

## 2024-11-19 PROCEDURE — 77063 BREAST TOMOSYNTHESIS BI: CPT | Performed by: RADIOLOGY

## 2024-11-19 PROCEDURE — 77067 SCR MAMMO BI INCL CAD: CPT

## 2024-11-19 PROCEDURE — 77067 SCR MAMMO BI INCL CAD: CPT | Performed by: RADIOLOGY

## 2024-12-08 DIAGNOSIS — E03.9 HYPOTHYROIDISM, UNSPECIFIED: ICD-10-CM

## 2024-12-08 RX ORDER — LEVOTHYROXINE SODIUM 75 UG/1
75 TABLET ORAL DAILY
Qty: 90 TABLET | Refills: 3 | Status: SHIPPED | OUTPATIENT
Start: 2024-12-08

## 2025-01-13 ENCOUNTER — APPOINTMENT (OUTPATIENT)
Dept: PRIMARY CARE | Facility: CLINIC | Age: 65
End: 2025-01-13
Payer: COMMERCIAL

## 2025-03-13 ENCOUNTER — APPOINTMENT (OUTPATIENT)
Dept: PRIMARY CARE | Facility: CLINIC | Age: 65
End: 2025-03-13
Payer: COMMERCIAL

## 2025-04-11 LAB
ALBUMIN SERPL-MCNC: 4.5 G/DL (ref 3.6–5.1)
ALP SERPL-CCNC: 72 U/L (ref 37–153)
ALT SERPL-CCNC: 32 U/L (ref 6–29)
ANION GAP SERPL CALCULATED.4IONS-SCNC: 12 MMOL/L (CALC) (ref 7–17)
AST SERPL-CCNC: 27 U/L (ref 10–35)
BILIRUB SERPL-MCNC: 0.7 MG/DL (ref 0.2–1.2)
BUN SERPL-MCNC: 16 MG/DL (ref 7–25)
CALCIUM SERPL-MCNC: 9.3 MG/DL (ref 8.6–10.4)
CHLORIDE SERPL-SCNC: 105 MMOL/L (ref 98–110)
CHOLEST SERPL-MCNC: 195 MG/DL
CHOLEST/HDLC SERPL: 4.1 (CALC)
CO2 SERPL-SCNC: 24 MMOL/L (ref 20–32)
CREAT SERPL-MCNC: 0.81 MG/DL (ref 0.5–1.05)
EGFRCR SERPLBLD CKD-EPI 2021: 81 ML/MIN/1.73M2
EST. AVERAGE GLUCOSE BLD GHB EST-MCNC: 157 MG/DL
EST. AVERAGE GLUCOSE BLD GHB EST-SCNC: 8.7 MMOL/L
GLUCOSE SERPL-MCNC: 117 MG/DL (ref 65–99)
HBA1C MFR BLD: 7.1 % OF TOTAL HGB
HDLC SERPL-MCNC: 48 MG/DL
LDLC SERPL CALC-MCNC: 118 MG/DL (CALC)
NONHDLC SERPL-MCNC: 147 MG/DL (CALC)
POTASSIUM SERPL-SCNC: 4.5 MMOL/L (ref 3.5–5.3)
PROT SERPL-MCNC: 6.7 G/DL (ref 6.1–8.1)
SODIUM SERPL-SCNC: 141 MMOL/L (ref 135–146)
TRIGL SERPL-MCNC: 175 MG/DL
TSH SERPL-ACNC: 2.53 MIU/L (ref 0.4–4.5)

## 2025-04-14 ENCOUNTER — APPOINTMENT (OUTPATIENT)
Dept: PRIMARY CARE | Facility: CLINIC | Age: 65
End: 2025-04-14
Payer: COMMERCIAL

## 2025-05-19 DIAGNOSIS — E78.5 HYPERLIPIDEMIA, UNSPECIFIED: ICD-10-CM

## 2025-05-19 RX ORDER — EZETIMIBE 10 MG/1
10 TABLET ORAL DAILY
Qty: 90 TABLET | Refills: 3 | Status: SHIPPED | OUTPATIENT
Start: 2025-05-19

## 2025-06-02 ENCOUNTER — APPOINTMENT (OUTPATIENT)
Dept: PRIMARY CARE | Facility: CLINIC | Age: 65
End: 2025-06-02
Payer: MEDICARE

## 2025-06-02 VITALS
BODY MASS INDEX: 33.8 KG/M2 | HEART RATE: 75 BPM | SYSTOLIC BLOOD PRESSURE: 140 MMHG | WEIGHT: 223 LBS | TEMPERATURE: 96.9 F | HEIGHT: 68 IN | DIASTOLIC BLOOD PRESSURE: 90 MMHG

## 2025-06-02 DIAGNOSIS — E11.9 TYPE 2 DIABETES MELLITUS WITHOUT COMPLICATION, WITHOUT LONG-TERM CURRENT USE OF INSULIN: ICD-10-CM

## 2025-06-02 DIAGNOSIS — E66.811 CLASS 1 OBESITY DUE TO EXCESS CALORIES WITH SERIOUS COMORBIDITY AND BODY MASS INDEX (BMI) OF 33.0 TO 33.9 IN ADULT: ICD-10-CM

## 2025-06-02 DIAGNOSIS — Z00.00 ROUTINE GENERAL MEDICAL EXAMINATION AT HEALTH CARE FACILITY: Primary | ICD-10-CM

## 2025-06-02 DIAGNOSIS — E66.09 CLASS 1 OBESITY DUE TO EXCESS CALORIES WITH SERIOUS COMORBIDITY AND BODY MASS INDEX (BMI) OF 33.0 TO 33.9 IN ADULT: ICD-10-CM

## 2025-06-02 PROBLEM — I10 PRIMARY HYPERTENSION: Status: ACTIVE | Noted: 2025-06-02

## 2025-06-02 PROCEDURE — G0402 INITIAL PREVENTIVE EXAM: HCPCS | Performed by: INTERNAL MEDICINE

## 2025-06-02 PROCEDURE — 3077F SYST BP >= 140 MM HG: CPT | Performed by: INTERNAL MEDICINE

## 2025-06-02 PROCEDURE — 1170F FXNL STATUS ASSESSED: CPT | Performed by: INTERNAL MEDICINE

## 2025-06-02 PROCEDURE — 1123F ACP DISCUSS/DSCN MKR DOCD: CPT | Performed by: INTERNAL MEDICINE

## 2025-06-02 PROCEDURE — 1159F MED LIST DOCD IN RCRD: CPT | Performed by: INTERNAL MEDICINE

## 2025-06-02 PROCEDURE — 4010F ACE/ARB THERAPY RXD/TAKEN: CPT | Performed by: INTERNAL MEDICINE

## 2025-06-02 PROCEDURE — 3008F BODY MASS INDEX DOCD: CPT | Performed by: INTERNAL MEDICINE

## 2025-06-02 PROCEDURE — 1036F TOBACCO NON-USER: CPT | Performed by: INTERNAL MEDICINE

## 2025-06-02 PROCEDURE — 3080F DIAST BP >= 90 MM HG: CPT | Performed by: INTERNAL MEDICINE

## 2025-06-02 RX ORDER — VALSARTAN 40 MG/1
40 TABLET ORAL DAILY
Qty: 30 TABLET | Refills: 11 | Status: SHIPPED | OUTPATIENT
Start: 2025-06-02 | End: 2026-06-02

## 2025-06-02 RX ORDER — METFORMIN HYDROCHLORIDE 500 MG/1
500 TABLET ORAL
Qty: 100 TABLET | Refills: 3 | Status: SHIPPED | OUTPATIENT
Start: 2025-06-02 | End: 2026-07-07

## 2025-06-02 RX ORDER — LANCETS
EACH MISCELLANEOUS
Qty: 100 EACH | Refills: 3 | Status: SHIPPED | OUTPATIENT
Start: 2025-06-02

## 2025-06-02 RX ORDER — BLOOD-GLUCOSE METER
1 EACH MISCELLANEOUS DAILY
Qty: 100 STRIP | Refills: 3 | Status: SHIPPED | OUTPATIENT
Start: 2025-06-02

## 2025-06-02 RX ORDER — DEXTROSE 4 G
TABLET,CHEWABLE ORAL
Qty: 1 EACH | Refills: 0 | Status: SHIPPED | OUTPATIENT
Start: 2025-06-02

## 2025-06-02 ASSESSMENT — ACTIVITIES OF DAILY LIVING (ADL)
MANAGING FINANCES: INDEPENDENT
BATHING: INDEPENDENT
DOING HOUSEWORK: INDEPENDENT
ADEQUATE_TO_COMPLETE_ADL: YES
GROCERY SHOPPING: INDEPENDENT
TAKING MEDICATION: INDEPENDENT
HEARING - RIGHT EAR: FUNCTIONAL
DRESSING YOURSELF: INDEPENDENT
USING TELEPHONE: INDEPENDENT
PREPARING MEALS: INDEPENDENT
PILL BOX USED: NO
FEEDING: INDEPENDENT
BATHING: INDEPENDENT
HEARING - LEFT EAR: FUNCTIONAL
USING TRANSPORTATION: INDEPENDENT
ADEQUATE_TO_COMPLETE_ADL: YES
WALKS IN HOME: INDEPENDENT
JUDGMENT_ADEQUATE_SAFELY_COMPLETE_DAILY_ACTIVITIES: YES
TOILETING: INDEPENDENT
EATING: INDEPENDENT
GROOMING: INDEPENDENT
STIL DRIVING: YES
DRESSING: INDEPENDENT
JUDGMENT_ADEQUATE_SAFELY_COMPLETE_DAILY_ACTIVITIES: YES
TOILETING: INDEPENDENT
NEEDS ASSISTANCE WITH FOOD: INDEPENDENT
PATIENT'S MEMORY ADEQUATE TO SAFELY COMPLETE DAILY ACTIVITIES?: YES
FEEDING YOURSELF: INDEPENDENT

## 2025-06-02 ASSESSMENT — ENCOUNTER SYMPTOMS
NEUROLOGICAL NEGATIVE: 1
LOSS OF SENSATION IN FEET: 0
GASTROINTESTINAL NEGATIVE: 1
CONSTITUTIONAL NEGATIVE: 1
OCCASIONAL FEELINGS OF UNSTEADINESS: 0
CARDIOVASCULAR NEGATIVE: 1
DEPRESSION: 0
MUSCULOSKELETAL NEGATIVE: 1
RESPIRATORY NEGATIVE: 1

## 2025-06-02 ASSESSMENT — PATIENT HEALTH QUESTIONNAIRE - PHQ9
1. LITTLE INTEREST OR PLEASURE IN DOING THINGS: NOT AT ALL
1. LITTLE INTEREST OR PLEASURE IN DOING THINGS: NOT AT ALL
SUM OF ALL RESPONSES TO PHQ9 QUESTIONS 1 AND 2: 0
2. FEELING DOWN, DEPRESSED OR HOPELESS: NOT AT ALL
SUM OF ALL RESPONSES TO PHQ9 QUESTIONS 1 AND 2: 0
2. FEELING DOWN, DEPRESSED OR HOPELESS: NOT AT ALL

## 2025-06-02 ASSESSMENT — COLUMBIA-SUICIDE SEVERITY RATING SCALE - C-SSRS
2. HAVE YOU ACTUALLY HAD ANY THOUGHTS OF KILLING YOURSELF?: NO
6. HAVE YOU EVER DONE ANYTHING, STARTED TO DO ANYTHING, OR PREPARED TO DO ANYTHING TO END YOUR LIFE?: NO
1. IN THE PAST MONTH, HAVE YOU WISHED YOU WERE DEAD OR WISHED YOU COULD GO TO SLEEP AND NOT WAKE UP?: NO

## 2025-06-02 NOTE — PROGRESS NOTES
"Subjective   Reason for Visit: Heather Patton is an 64 y.o. female here for a Medicare Wellness visit.     Past Medical, Surgical, and Family History reviewed and updated in chart.    Reviewed all medications by prescribing practitioner or clinical pharmacist (such as prescriptions, OTCs, herbal therapies and supplements) and documented in the medical record.    64-year-old female patient was seen for welcome Medicare exam.  She just retired recently, she has been a , she was noticed to have a slowly increasing hemoglobin A1c value this time, it is 7.1 this time.  She has been treated for right ventricular outflow ventricular irritability with the procedure and ablation.  She has not have any significant PVCs lately, her colonoscopy scheduled in the next 3 weeks, today I noticed that her blood pressure readings are high also, she is not that much physically active, she has not lost weight, previously she has screening and investigations done for coronary artery disease.  Otherwise she seems to be doing well, her BMI is 33, could be more physically active, they have a living will, she says that both her daughters were 9 pounds and she was not having significant gestational diabetes at that time.        Patient Care Team:  Eber Bender MD as PCP - General     Review of Systems   Constitutional: Negative.    Respiratory: Negative.     Cardiovascular: Negative.    Gastrointestinal: Negative.    Musculoskeletal: Negative.    Neurological: Negative.        Objective   Vitals:  /90 (BP Location: Right arm, Patient Position: Sitting, BP Cuff Size: Adult)   Pulse 75   Temp 36.1 °C (96.9 °F) (Temporal)   Ht 1.727 m (5' 8\")   Wt 101 kg (223 lb)   BMI 33.91 kg/m²       Physical Exam  Constitutional:       Appearance: Normal appearance. She is obese.   HENT:      Head: Normocephalic.      Nose: Nose normal.   Eyes:      Conjunctiva/sclera: Conjunctivae normal.   Cardiovascular:      Rate and Rhythm: " Normal rate and regular rhythm.      Pulses: Normal pulses.      Heart sounds: Normal heart sounds.   Pulmonary:      Effort: Pulmonary effort is normal.      Breath sounds: Normal breath sounds.   Abdominal:      General: Abdomen is flat.      Palpations: Abdomen is soft.   Musculoskeletal:         General: Normal range of motion.      Cervical back: Neck supple.   Skin:     General: Skin is warm and dry.   Neurological:      General: No focal deficit present.      Mental Status: She is oriented to person, place, and time. Mental status is at baseline.   Psychiatric:         Mood and Affect: Mood normal.         Judgment: Judgment normal.         Assessment & Plan  Class 1 obesity due to excess calories with serious comorbidity and body mass index (BMI) of 33.0 to 33.9 in adult         Routine general medical examination at health care facility    Orders:    1 Year Follow Up In Primary Care - Wellness Exam; Future    Type 2 diabetes mellitus without complication, without long-term current use of insulin    Orders:    metFORMIN (Glucophage) 500 mg tablet; Take 1 tablet (500 mg) by mouth once daily with breakfast.    valsartan (Diovan) 40 mg tablet; Take 1 tablet (40 mg) by mouth once daily.    Albumin-Creatinine Ratio, Urine Random; Future  Welcome Medicare exam was completed, she has been seen by ophthalmology has a cataract surgery done, her hearing is intact, we will have colonoscopy done in appropriate timeframe, would start metformin 500 mg, she will start after colonoscopy, glucometer and supplies were given for cutaneous glucose reading monitoring.  Borderline BP readings today, considering diabetes and abnormal hemoglobin A1c would consider ARB, ARB treatment can protect her kidneys from deleterious effects of diabetes in the future, explained how it works, she will have a hemoglobin A1c next time, living will should be up-to-date, healthcare power of  needs to be up to date, vaccinations are  reviewed, otherwise she looks well, patient is known to me for several years.  No depression, anhedonia or cognitive impairment, does not have any significant change in her physical health, she has adequate sleeping, she has her own domiciliary set up where she lives with her spouse, could be more physically active, could be observing her diet somewhat more, there is a role of carbohydrate restricted diet being diabetic with the high hemoglobin A1c we discussed.  Complete physical exam was done, microalbumin will be added next time, notify me with blood pressure readings and sugar readings via [employer].  Follow-up in 4 months.

## 2025-06-11 ENCOUNTER — CLINICAL SUPPORT (OUTPATIENT)
Dept: PREADMISSION TESTING | Facility: HOSPITAL | Age: 65
End: 2025-06-11
Payer: MEDICARE

## 2025-06-11 VITALS — BODY MASS INDEX: 33.34 KG/M2 | WEIGHT: 220 LBS | HEIGHT: 68 IN

## 2025-06-11 NOTE — PREPROCEDURE INSTRUCTIONS

## 2025-06-22 ENCOUNTER — TELEPHONE (OUTPATIENT)
Dept: SURGERY | Facility: HOSPITAL | Age: 65
End: 2025-06-22

## 2025-06-23 ENCOUNTER — ANESTHESIA (OUTPATIENT)
Dept: GASTROENTEROLOGY | Facility: HOSPITAL | Age: 65
End: 2025-06-23
Payer: MEDICARE

## 2025-06-23 ENCOUNTER — HOSPITAL ENCOUNTER (OUTPATIENT)
Dept: GASTROENTEROLOGY | Facility: HOSPITAL | Age: 65
Discharge: HOME | End: 2025-06-23
Payer: MEDICARE

## 2025-06-23 ENCOUNTER — ANESTHESIA EVENT (OUTPATIENT)
Dept: GASTROENTEROLOGY | Facility: HOSPITAL | Age: 65
End: 2025-06-23
Payer: MEDICARE

## 2025-06-23 VITALS
BODY MASS INDEX: 32.78 KG/M2 | SYSTOLIC BLOOD PRESSURE: 129 MMHG | OXYGEN SATURATION: 100 % | TEMPERATURE: 96.8 F | WEIGHT: 216.27 LBS | RESPIRATION RATE: 16 BRPM | DIASTOLIC BLOOD PRESSURE: 104 MMHG | HEIGHT: 68 IN | HEART RATE: 78 BPM

## 2025-06-23 DIAGNOSIS — Z12.11 COLON CANCER SCREENING: Primary | ICD-10-CM

## 2025-06-23 LAB — GLUCOSE BLD MANUAL STRIP-MCNC: 112 MG/DL (ref 74–99)

## 2025-06-23 PROCEDURE — 7100000010 HC PHASE TWO TIME - EACH INCREMENTAL 1 MINUTE

## 2025-06-23 PROCEDURE — 82947 ASSAY GLUCOSE BLOOD QUANT: CPT

## 2025-06-23 PROCEDURE — 7100000009 HC PHASE TWO TIME - INITIAL BASE CHARGE

## 2025-06-23 PROCEDURE — G0121 COLON CA SCRN NOT HI RSK IND: HCPCS | Performed by: SURGERY

## 2025-06-23 PROCEDURE — G0105 COLORECTAL SCRN; HI RISK IND: HCPCS | Performed by: SURGERY

## 2025-06-23 PROCEDURE — 45378 DIAGNOSTIC COLONOSCOPY: CPT | Performed by: SURGERY

## 2025-06-23 PROCEDURE — 3700000002 HC GENERAL ANESTHESIA TIME - EACH INCREMENTAL 1 MINUTE

## 2025-06-23 PROCEDURE — 2500000004 HC RX 250 GENERAL PHARMACY W/ HCPCS (ALT 636 FOR OP/ED): Performed by: NURSE ANESTHETIST, CERTIFIED REGISTERED

## 2025-06-23 PROCEDURE — 3700000001 HC GENERAL ANESTHESIA TIME - INITIAL BASE CHARGE

## 2025-06-23 RX ORDER — PROPOFOL 10 MG/ML
INJECTION, EMULSION INTRAVENOUS AS NEEDED
Status: DISCONTINUED | OUTPATIENT
Start: 2025-06-23 | End: 2025-06-23

## 2025-06-23 RX ADMIN — SODIUM CHLORIDE, POTASSIUM CHLORIDE, SODIUM LACTATE AND CALCIUM CHLORIDE: 600; 310; 30; 20 INJECTION, SOLUTION INTRAVENOUS at 07:38

## 2025-06-23 RX ADMIN — PROPOFOL 150 MG: 10 INJECTION, EMULSION INTRAVENOUS at 07:43

## 2025-06-23 RX ADMIN — PROPOFOL 100 MG: 10 INJECTION, EMULSION INTRAVENOUS at 07:55

## 2025-06-23 RX ADMIN — PROPOFOL 50 MG: 10 INJECTION, EMULSION INTRAVENOUS at 07:46

## 2025-06-23 SDOH — HEALTH STABILITY: MENTAL HEALTH: CURRENT SMOKER: 0

## 2025-06-23 ASSESSMENT — ENCOUNTER SYMPTOMS
CONSTITUTIONAL NEGATIVE: 1
RESPIRATORY NEGATIVE: 1
ALLERGIC/IMMUNOLOGIC NEGATIVE: 1
PSYCHIATRIC NEGATIVE: 1
MUSCULOSKELETAL NEGATIVE: 1
NEUROLOGICAL NEGATIVE: 1
EYES NEGATIVE: 1
ENDOCRINE NEGATIVE: 1
GASTROINTESTINAL NEGATIVE: 1
HEMATOLOGIC/LYMPHATIC NEGATIVE: 1
CARDIOVASCULAR NEGATIVE: 1

## 2025-06-23 ASSESSMENT — PAIN - FUNCTIONAL ASSESSMENT
PAIN_FUNCTIONAL_ASSESSMENT: 0-10

## 2025-06-23 ASSESSMENT — PAIN SCALES - GENERAL
PAINLEVEL_OUTOF10: 0 - NO PAIN
PAIN_LEVEL: 0
PAINLEVEL_OUTOF10: 0 - NO PAIN
PAINLEVEL_OUTOF10: 0 - NO PAIN

## 2025-06-23 ASSESSMENT — COLUMBIA-SUICIDE SEVERITY RATING SCALE - C-SSRS
1. IN THE PAST MONTH, HAVE YOU WISHED YOU WERE DEAD OR WISHED YOU COULD GO TO SLEEP AND NOT WAKE UP?: NO
6. HAVE YOU EVER DONE ANYTHING, STARTED TO DO ANYTHING, OR PREPARED TO DO ANYTHING TO END YOUR LIFE?: NO
2. HAVE YOU ACTUALLY HAD ANY THOUGHTS OF KILLING YOURSELF?: NO

## 2025-06-23 NOTE — ANESTHESIA POSTPROCEDURE EVALUATION
Patient: Heather Patton    Procedure Summary       Date: 06/23/25 Room / Location: [facility]    Anesthesia Start: 0738 Anesthesia Stop: 0802    Procedure: COLONOSCOPY Diagnosis:       Colon cancer screening      Colon cancer screening    Scheduled Providers: Katherine Kowalski MD; Evelia Kan MD; Ni Vela RN; Ifrah Babb Responsible Provider: Evelia Kan MD    Anesthesia Type: spinal, general ASA Status: 3            Anesthesia Type: spinal, general    Vitals Value Taken Time   /75 06/23/25 08:02   Temp 36 06/23/25 08:02   Pulse 90 06/23/25 08:02   Resp 20 06/23/25 08:02   SpO2 98 06/23/25 08:02       Anesthesia Post Evaluation    Patient location during evaluation: bedside  Patient participation: complete - patient participated  Level of consciousness: awake and alert  Pain score: 0  Pain management: adequate  Airway patency: patent  Cardiovascular status: acceptable  Respiratory status: acceptable  Hydration status: acceptable  Postoperative Nausea and Vomiting: none        No notable events documented.

## 2025-06-23 NOTE — ANESTHESIA PREPROCEDURE EVALUATION
Patient: Heather Patton    Procedure Information       Date/Time: 06/23/25 0745    Scheduled providers: Katherine Kowalski MD; Evelia Kan MD    Procedure: COLONOSCOPY    Location: [facility]            Relevant Problems   Cardiac   (+) Hyperlipidemia   (+) PVC (premature ventricular contraction)   (+) Primary hypertension      /Renal   (+) Nephrolithiasis      Endocrine   (+) Class 1 obesity due to excess calories without serious comorbidity with body mass index (BMI) of 33.0 to 33.9 in adult   (+) Hypothyroidism       Clinical information reviewed:    Allergies  Meds               NPO Detail:  No data recorded     Physical Exam    Airway  Mallampati: II  TM distance: >3 FB  Neck ROM: full  Mouth opening: 3 or more finger widths     Cardiovascular - normal exam   Dental    Pulmonary - normal exam   Abdominal - normal exam           Anesthesia Plan    History of general anesthesia?: yes  History of complications of general anesthesia?: no    ASA 3     MAC     The patient is not a current smoker.  Patient was previously instructed to abstain from smoking on day of procedure.  Patient did not smoke on day of procedure.    intravenous induction   Anesthetic plan and risks discussed with patient.    Plan discussed with CRNA and attending.

## 2025-06-23 NOTE — Clinical Note
Huddle and Timeout completed together with team. Patient wristband and AYSE information verified.  Anesthesia safety check completed. Patient was awake and oriented x4

## 2025-06-23 NOTE — H&P
History Of Present Illness  Heather Patton is a 64 y.o. female presenting with history of polyps.     Past Medical History  Medical History[1]    Surgical History  Surgical History[2]     Social History  She reports that she has never smoked. She has never used smokeless tobacco. She reports that she does not drink alcohol and does not use drugs.    Family History  Family History[3]     Allergies  Adhesive tape-silicones    Review of Systems   Constitutional: Negative.    HENT: Negative.     Eyes: Negative.    Respiratory: Negative.     Cardiovascular: Negative.    Gastrointestinal: Negative.    Endocrine: Negative.    Genitourinary: Negative.    Musculoskeletal: Negative.    Skin: Negative.    Allergic/Immunologic: Negative.    Neurological: Negative.    Hematological: Negative.    Psychiatric/Behavioral: Negative.          Physical Exam  Constitutional:       General: She is not in acute distress.     Appearance: Normal appearance. She is normal weight. She is not toxic-appearing or diaphoretic.   HENT:      Head: Normocephalic and atraumatic.   Eyes:      Conjunctiva/sclera: Conjunctivae normal.      Pupils: Pupils are equal, round, and reactive to light.   Cardiovascular:      Rate and Rhythm: Normal rate and regular rhythm.   Pulmonary:      Effort: Pulmonary effort is normal. No respiratory distress.   Abdominal:      General: Abdomen is flat. There is no distension.      Palpations: Abdomen is soft. There is no mass.      Tenderness: There is no abdominal tenderness.      Hernia: No hernia is present.   Musculoskeletal:         General: No swelling.   Skin:     General: Skin is warm and dry.   Neurological:      General: No focal deficit present.      Mental Status: She is alert. Mental status is at baseline.   Psychiatric:         Mood and Affect: Mood normal.         Behavior: Behavior normal.         Thought Content: Thought content normal.         Judgment: Judgment normal.          Last Recorded  "Vitals  Blood pressure 127/83, pulse 103, temperature 36 °C (96.8 °F), temperature source Temporal, resp. rate 16, height 1.727 m (5' 8\"), weight 98.1 kg (216 lb 4.3 oz), SpO2 97%.    Relevant Results             Assessment & Plan  Colon cancer screening      Patient is scheduled for colonoscopy. Indications for procedure discuss including risks of bleeding, perforation. The patient understands and wishes to proceed    I spent  minutes in the professional and overall care of this patient.      Katherine Kowalski MD         [1]   Past Medical History:  Diagnosis Date    Disease of thyroid gland     Diverticulosis     H/O cardiac radiofrequency ablation     Nephrolithiasis     Nonsustained supraventricular tachycardia     PVC (premature ventricular contraction)     Type 2 diabetes mellitus    [2]   Past Surgical History:  Procedure Laterality Date    BREAST SURGERY Right 1979    cyst    CARDIAC ELECTROPHYSIOLOGY MAPPING AND ABLATION      CATARACT EXTRACTION       SECTION, LOW TRANSVERSE  1994     SECTION, LOW TRANSVERSE      with hernia repair    COLONOSCOPY      CYSTOSCOPY      DILATION AND CURETTAGE OF UTERUS      ENDOMETRIAL ABLATION  2000    HERNIA REPAIR  2023    HYSTEROSCOPY  2019    Hysteroscopy    LITHOTRIPSY      TUBAL LIGATION  2000    WISDOM TOOTH EXTRACTION     [3]   Family History  Problem Relation Name Age of Onset    Asthma Mother Marry Navarro     COPD Mother Marry Navarro     Diabetes Mother Marry Navarro     Asthma Father Ian Navarro     Diabetes Father Ian Navarro     Heart disease Father Ian Navarro     Hyperlipidemia Father Ian Navarro     Alcohol abuse Maternal Grandfather Huber Navarro      "

## 2025-06-23 NOTE — DISCHARGE INSTRUCTIONS
Patient Instructions after a Colonoscopy      The anesthetics, sedatives or narcotics which were given to you today will be acting in your body for the next 24 hours, so you might feel a little sleepy or groggy.  This feeling should slowly wear off. Carefully read and follow the instructions.     You received sedation today:  - Do not drive or operate any machinery or power tools of any kind.   - No alcoholic beverages today, not even beer or wine.  - Do not make any important decisions or sign any legal documents.  - No over the counter medications that contain alcohol or that may cause drowsiness.  - Do not make any important decisions or sign any legal documents.    While it is common to experience mild to moderate abdominal distention, gas, or belching after your procedure, if any of these symptoms occur following discharge from the GI Lab or within one week of having your procedure, call the [facility] to be advised whether a visit to your nearest Urgent Care or Emergency Department is indicated.  Take this paper with you if you go.     - If you develop an allergic reaction to the medications that were given during your procedure such as difficulty breathing, rash, hives, severe nausea, vomiting or lightheadedness.  - If you experience chest pain, shortness of breath, severe abdominal pain, fevers and chills.  -If you develop signs and symptoms of bleeding such as blood in your spit, if your stools turn black, tarry, or bloody  - If you have not urinated within 8 hours following your procedure.  - If your IV site becomes painful, red, inflamed, or looks infected.    If you received a biopsy/polypectomy/sphincterotomy the following instructions apply below:    __ Do not use Aspirin containing products, non-steroidal medications or anti-coagulants for one week following your procedure. (Examples of these types of medications are: Advil, Arthrotec, Aleve, Coumadin, Ecotrin, Heparin, Ibuprofen,  Indocin, Motrin, Naprosyn, Nuprin, Plavix, Vioxx, and Voltarin, or their generic forms.  This list is not all-inclusive.  Check with your physician or pharmacist before resuming medications.)   __ Eat a soft diet today.  Avoid foods that are poorly digested for the next 24 hours.  These foods would include: nuts, beans, lettuce, red meats, and fried foods. Start with liquids and advance your diet as tolerated, gradually work up to eating solids.   __ Do not have a Barium Study or Enema for one week.    Your physician recommends the additional following instructions:    -You have a contact number available for emergencies. The signs and symptoms of potential delayed complications were discussed with you. You may return to normal activities tomorrow.  -Resume your previous diet.  -Continue your present medications.   -We are waiting for your pathology results.  -Your physician has recommended a repeat colonoscopy (date to be determined after pending pathology results are reviewed) for surveillance based on pathology results.  -The findings and recommendations have been discussed with you.  -The findings and recommendations were discussed with your family.  - Please see Medication Reconciliation Form for new medication/medications prescribed.       If you experience any problems or have any questions following discharge from the GI Lab, please call:  Before 5p.m.  [phone]  After 5p.m.    [phone]    Nurse Signature                                                                        Date___________________                                                                            Patient/Responsible Party Signature                                        Date___________________    Physician phone number provided to patient    Diverticulosis Discharge Instructions    About this topic  Diverticulosis is a problem of the large bowel or colon. The wall of the bowel becomes weak and pushes outward. They form  "balloon-like pouches called diverticula or \"tics.\" When you have hard stool, you strain to have a bowel movement. This raises the pressure in the bowel and causes pouches or bulges to form. Most often, they do not cause a problem. If they become infected, you have diverticulitis. If you have both bleeding and infection it is diverticular disease.    What care is needed at home?  Ask your doctor what you need to do when you go home. Make sure you ask questions if you do not understand what the doctor says.  Eat more whole grains, vegetables, and fruits.  Do not wait to have a bowel movement. Go as soon as you have the urge.  Drink 8 to 10 glasses of water each day. Talk to your doctor if you are drinking less fluids due to a health problem.  Be active. Walk, garden, or do something active for 30 minutes or more on most days of the week.  What follow-up care is needed?  Your doctor may ask you to make visits to the office to check on your progress. Be sure to keep these visits.  What drugs may be needed?  Most often with diverticulosis you will not need to take any drugs.  Will physical activity be limited?  When you are in pain, you may need to rest in bed. To ease the pain, use a heat compress on your belly. This should last only for a few days.  What changes to diet are needed?  Talk to your doctor about any changes you need to make to your diet.  You do not need to avoid seeds, nuts, corn, or other similar foods.  You will need to eat food rich in fiber and drink more water.  Eat 5 or more servings of fresh fruits and vegetables every day.  Eat 6 or more servings of whole-wheat grain breads and cereals.  Try to get 25 to 30 grams of fiber every day. Read the labels to learn how much fiber is in foods.  Do not drink coffee, tea, or beer, wine, and mixed drinks (alcohol).  What problems could happen?  You may develop diverticulitis, which may cause:  Pockets or pouches in your bowel may be infected or filled with " pus.  Hole or tear in your bowel  Part of your bowel to become narrow  You to need surgery  What can be done to prevent this health problem?  The best way to keep from having diverticulosis is to keep your bowel movements soft and normal. To keep more pouches from forming:  Talk with your doctor about adding an over-the-counter (OTC) fiber product to keep your stools soft.  Limit how much pain drugs you take. Overuse of some pain drugs can cause hard stools; talk with your doctor.  When do I need to call the doctor?  Signs of infection. These include a fever of 100.4°F (38°C) or higher, chills.  Mild pain or cramping in the lower part of the belly  A feeling of bloating in the belly  Belly pain that gets worse  Blood in your stool  Upset stomach or throwing up  Stools get too loose or too hard  Long-term hard stools  Teach Back: Helping You Understand  The Teach Back Method helps you understand the information we are giving you. After you talk with the staff, tell them in your own words what you learned. This helps to make sure the staff has described each thing clearly. It also helps to explain things that may have been confusing. Before going home, make sure you are able to do these:  I can tell you about my condition.  I can tell you what changes I need to make with my diet or drugs.  I can tell you what I will do if I have pain or cramping in my lower belly or I have more belly pain.  Where can I learn more?  [employer]org  http://familydoctor.org/familydoctor/en/diseases-conditions/diverticular-disease.html  [facility]  https://www.nhs.[place]/conditions/diverticular-disease-and-diverticulitis/    High Fiber Diet    About this topic  Dietary fiber helps many illnesses. It can help you if you cannot have a bowel movement or if you have loose stools. Fiber can also lower your risk of diabetes and heart disease. Fiber can help with weight loss by helping you feel ahumada after meals.  You can find fiber in fruits,  vegetables, nuts and seeds, whole grains, and legumes. The fiber is the part of the plant food that your body cannot break down and absorb. It passes through your stomach, small bowel, colon, and out your body.  There are two kinds of fiber: Insoluble and soluble fiber. Insoluble fiber helps you pass foods through your digestive system. Insoluble fiber can help you with hard stools. Soluble fiber draws water in and turns it into a gel-like form making digestion slow down. Both are important.    What will the results be?  A high fiber diet can help you with bowel problems like stools that are too hard or too loose. It can also help prevent hemorrhoids and other colon problems. A high fiber diet can also help control your weight and lower blood sugar and cholesterol levels.  What changes to diet are needed?  The amount of fiber you need is based on your age, gender, and health.  Try to get 20 to 35 grams of fiber in your diet each day. Most people in the  only eat 15 grams of fiber daily.  Drink at least 8 cups (1920 mL) of fluid each day.  When is this diet used?  Your doctor may talk with you about this diet if you have belly problems.  Who should use this diet?  Older children, young people, and adults can have this diet.  Who should not use this diet?  Some people should not use this diet. Check with your doctor if you have:  Diverticulitis  Active Crohn's disease  Ulcerative colitis  Bowel inflammation  Certain types of GI surgery  Talk to your child's doctor before starting your child on a high fiber diet.  What foods are good to eat?  To get the most from fiber in your diet, eat a wide variety of high fiber foods. Some examples are:  Vegetables like:  Spinach  Peas  Artichoke  Sweet potatoes with skin  Broccoli  Fruits like:  Raspberries  Blueberries  Blackberries  Apples with skin  Dried fruits  Grains like:  Oat bran  Barley  Whole wheat products  Wheat bran  Dried beans and nuts like:  Sunflower  seeds  Almonds  Black beans  Chickpeas  What problems could happen?  Sudden increase of fiber intake can lead to gas, pain, fullness in your belly, and loose stools. Increase fiber gradually while drinking plenty of fluids.  Do not eat too much fiber. Your body will not take in vitamins and minerals as well if you eat too much fiber.  When do I need to call the doctor?  Health problem is not better or you are feeling worse.  Helpful tips  Start slow as you add more fiber to your diet. This may help prevent gas or cramps.  Try to eat the same amount of fiber each day. Aim to get your fiber from nutritious foods. Supplements do not offer the same benefits as food.  Read food labels with care to learn how much fiber is in the food you are eating.  If possible, do not peel fruits or vegetables before you eat them. Eating the peel gives you more fiber.  Where can I learn more?  [employer] Right  https://www.eatright.org/food/vitamins-and-supplements/types-of-vitamins-and-nutrients/easy-ways-to-boost-fiber-in-your-daily-diet  Last Reviewed Date  2021-09-23

## 2025-06-30 DIAGNOSIS — E78.5 HYPERLIPIDEMIA, UNSPECIFIED: ICD-10-CM

## 2025-06-30 RX ORDER — ROSUVASTATIN CALCIUM 20 MG/1
20 TABLET, COATED ORAL DAILY
Qty: 90 TABLET | Refills: 3 | Status: SHIPPED | OUTPATIENT
Start: 2025-06-30

## 2025-11-03 ENCOUNTER — APPOINTMENT (OUTPATIENT)
Dept: PRIMARY CARE | Facility: CLINIC | Age: 65
End: 2025-11-03
Payer: MEDICARE